# Patient Record
Sex: FEMALE | Race: WHITE | NOT HISPANIC OR LATINO | URBAN - METROPOLITAN AREA
[De-identification: names, ages, dates, MRNs, and addresses within clinical notes are randomized per-mention and may not be internally consistent; named-entity substitution may affect disease eponyms.]

---

## 2018-06-18 ENCOUNTER — APPOINTMENT (RX ONLY)
Dept: URBAN - METROPOLITAN AREA CLINIC 23 | Facility: CLINIC | Age: 63
Setting detail: DERMATOLOGY
End: 2018-06-18

## 2018-06-18 DIAGNOSIS — L81.4 OTHER MELANIN HYPERPIGMENTATION: ICD-10-CM

## 2018-06-18 DIAGNOSIS — D22 MELANOCYTIC NEVI: ICD-10-CM

## 2018-06-18 DIAGNOSIS — D18.0 HEMANGIOMA: ICD-10-CM

## 2018-06-18 DIAGNOSIS — L72.8 OTHER FOLLICULAR CYSTS OF THE SKIN AND SUBCUTANEOUS TISSUE: ICD-10-CM

## 2018-06-18 PROBLEM — D18.01 HEMANGIOMA OF SKIN AND SUBCUTANEOUS TISSUE: Status: ACTIVE | Noted: 2018-06-18

## 2018-06-18 PROBLEM — F41.9 ANXIETY DISORDER, UNSPECIFIED: Status: ACTIVE | Noted: 2018-06-18

## 2018-06-18 PROBLEM — D22.5 MELANOCYTIC NEVI OF TRUNK: Status: ACTIVE | Noted: 2018-06-18

## 2018-06-18 PROBLEM — D23.62 OTHER BENIGN NEOPLASM OF SKIN OF LEFT UPPER LIMB, INCLUDING SHOULDER: Status: ACTIVE | Noted: 2018-06-18

## 2018-06-18 PROCEDURE — 11300 SHAVE SKIN LESION 0.5 CM/<: CPT

## 2018-06-18 PROCEDURE — ? COUNSELING

## 2018-06-18 PROCEDURE — ? SHAVE REMOVAL

## 2018-06-18 PROCEDURE — 99202 OFFICE O/P NEW SF 15 MIN: CPT | Mod: 25

## 2018-06-18 ASSESSMENT — LOCATION DETAILED DESCRIPTION DERM
LOCATION DETAILED: LEFT LATERAL SUPERIOR CHEST
LOCATION DETAILED: RIGHT POSTERIOR SHOULDER
LOCATION DETAILED: LEFT POSTERIOR SHOULDER
LOCATION DETAILED: MIDDLE STERNUM
LOCATION DETAILED: RIGHT MEDIAL UPPER BACK

## 2018-06-18 ASSESSMENT — LOCATION ZONE DERM
LOCATION ZONE: TRUNK
LOCATION ZONE: ARM

## 2018-06-18 ASSESSMENT — LOCATION SIMPLE DESCRIPTION DERM
LOCATION SIMPLE: RIGHT UPPER BACK
LOCATION SIMPLE: RIGHT SHOULDER
LOCATION SIMPLE: LEFT SHOULDER
LOCATION SIMPLE: CHEST

## 2018-06-18 NOTE — PROCEDURE: SHAVE REMOVAL
Medical Necessity Information: It is in your best interest to select a reason for this procedure from the list below. All of these items fulfill various CMS LCD requirements except the new and changing color options.
Was A Bandage Applied: Yes
Consent was obtained from the patient. The risks and benefits to therapy were discussed in detail. Specifically, the risks of infection, scarring, bleeding, prolonged wound healing, incomplete removal, allergy to anesthesia, nerve injury and recurrence were addressed. Prior to the procedure, the treatment site was clearly identified and confirmed by the patient. All components of Universal Protocol/PAUSE Rule completed.
Anesthesia Type: 1% lidocaine with epinephrine
Post-Care Instructions: I reviewed with the patient in detail post-care instructions. Patient is to keep the biopsy site dry overnight, and then apply Vaseline and bandaid daily until healed. Patient may apply diluted hydrogen peroxide soaks to remove any crusting.
Size Of Margin In Cm (Margins Are Not Added To Billing Dimensions): -
Medical Necessity Clause: This procedure was medically necessary because the lesion that was treated was:
Billing Type: Third-Party Bill
X Size Of Lesion In Cm (Optional): 0
Size Of Lesion In Cm (Required): 0.4
Bill For Surgical Tray: no
Notification Instructions: Patient will be notified of biopsy results. However, patient instructed to call the office if not contacted within 2 weeks.
Anesthesia Volume In Cc: 0.3
Detail Level: Detailed
Wound Care: Vaseline
Accession #: [id number]
Path Notes (To The Dermatopathologist): Please check margins.
Biopsy Method: Dermablade
Hemostasis: Aluminum Chloride

## 2022-05-20 ENCOUNTER — HOSPITAL ENCOUNTER (OUTPATIENT)
Dept: MAMMOGRAPHY | Age: 67
Discharge: HOME | End: 2022-05-20
Attending: NURSE PRACTITIONER
Payer: MEDICARE

## 2022-05-20 DIAGNOSIS — Z78.0 POSTMENOPAUSAL STATE: ICD-10-CM

## 2022-05-20 DIAGNOSIS — Z12.31 SCREENING MAMMOGRAM, ENCOUNTER FOR: ICD-10-CM

## 2022-05-20 PROCEDURE — 77067 SCR MAMMO BI INCL CAD: CPT

## 2022-05-20 PROCEDURE — 77080 DXA BONE DENSITY AXIAL: CPT

## 2022-05-20 NOTE — PROGRESS NOTES
Please let patient know her dexa reveals low bone density. Increase calcium/vitamin D intake and weight baring exercises. Repeat scan in 2 years.      Ana Christianson NP

## 2022-05-20 NOTE — PROGRESS NOTES
I spoke with patient letting her know that her DEXA reveals low bone density. Advised to increase calcium/Vit D intake and do weight bearing exercises.   Repeat in 2 years

## 2022-06-10 DIAGNOSIS — I78.1 SPIDER VEINS: Primary | ICD-10-CM

## 2022-07-12 ENCOUNTER — TELEMEDICINE (OUTPATIENT)
Dept: FAMILY MEDICINE CLINIC | Facility: CLINIC | Age: 67
End: 2022-07-12
Payer: MEDICARE

## 2022-07-12 DIAGNOSIS — H92.01 RIGHT EAR PAIN: Primary | ICD-10-CM

## 2022-07-12 PROCEDURE — 99443 PR PHYS/QHP TELEPHONE EVALUATION 21-30 MIN: CPT | Performed by: FAMILY MEDICINE

## 2022-07-12 RX ORDER — AMOXICILLIN AND CLAVULANATE POTASSIUM 875; 125 MG/1; MG/1
1 TABLET, FILM COATED ORAL 2 TIMES DAILY
Qty: 20 TABLET | Refills: 0 | Status: SHIPPED | OUTPATIENT
Start: 2022-07-12 | End: 2022-07-14 | Stop reason: ALTCHOICE

## 2022-07-12 ASSESSMENT — PATIENT HEALTH QUESTIONNAIRE - PHQ9
SUM OF ALL RESPONSES TO PHQ9 QUESTIONS 1 & 2: 0
SUM OF ALL RESPONSES TO PHQ QUESTIONS 1-9: 0
SUM OF ALL RESPONSES TO PHQ QUESTIONS 1-9: 0
2. FEELING DOWN, DEPRESSED OR HOPELESS: 0
SUM OF ALL RESPONSES TO PHQ QUESTIONS 1-9: 0
1. LITTLE INTEREST OR PLEASURE IN DOING THINGS: 0
SUM OF ALL RESPONSES TO PHQ QUESTIONS 1-9: 0

## 2022-07-12 ASSESSMENT — ANXIETY QUESTIONNAIRES
7. FEELING AFRAID AS IF SOMETHING AWFUL MIGHT HAPPEN: 0
2. NOT BEING ABLE TO STOP OR CONTROL WORRYING: 0
3. WORRYING TOO MUCH ABOUT DIFFERENT THINGS: 0
5. BEING SO RESTLESS THAT IT IS HARD TO SIT STILL: 0
6. BECOMING EASILY ANNOYED OR IRRITABLE: 0
1. FEELING NERVOUS, ANXIOUS, OR ON EDGE: 0
4. TROUBLE RELAXING: 0
GAD7 TOTAL SCORE: 0
IF YOU CHECKED OFF ANY PROBLEMS ON THIS QUESTIONNAIRE, HOW DIFFICULT HAVE THESE PROBLEMS MADE IT FOR YOU TO DO YOUR WORK, TAKE CARE OF THINGS AT HOME, OR GET ALONG WITH OTHER PEOPLE: NOT DIFFICULT AT ALL

## 2022-07-12 ASSESSMENT — ENCOUNTER SYMPTOMS
SHORTNESS OF BREATH: 0
COUGH: 0
SINUS PAIN: 1

## 2022-07-12 NOTE — PROGRESS NOTES
Agustín Ellis (: 1955) is a 79 y.o. female, established patient, here for evaluation of the following chief complaint(s):   Otalgia (right ear andside of face hurts x 3 days, Advil,Nasal Spray, Ear drops) and Laryngitis       ASSESSMENT/PLAN:  1. Right ear pain  -     amoxicillin-clavulanate (AUGMENTIN) 875-125 MG per tablet; Take 1 tablet by mouth 2 times daily for 10 days, Disp-20 tablet, R-0Normal  Will trial amoxicillin for possible otitis media, bacterial sinusitis. If any tooth pain or potential abscesses develop will call dentist right away. We will also call if any new or worsening symptoms develop. Return if symptoms worsen or fail to improve. SUBJECTIVE/OBJECTIVE:  HPI   Patient presents with ear pressure and sinus pressure since Friday. She also had some jaw pain. She says her right ear feels very full. She denies any particular tooth pain, obvious redness, swelling, abscess. She denies any obvious or tender lymph nodes. She has had no cough, sore throat, fevers, congestion. She has had some sinus pressure. No COVID contacts or otherwise sick contacts. Review of Systems   Constitutional: Negative for activity change, appetite change, fever and unexpected weight change. HENT: Positive for ear pain and sinus pain. Respiratory: Negative for cough and shortness of breath. Cardiovascular: Negative for chest pain and palpitations. Skin: Negative for rash. Neurological: Negative for dizziness and headaches. Psychiatric/Behavioral: Negative for sleep disturbance. No flowsheet data found. Physical Exam    There were no vitals filed for this visit. Agustín Ellis, was evaluated through a synchronous (real-time) audio-video encounter. The patient (or guardian if applicable) is aware that this is a billable service, which includes applicable co-pays. This Virtual Visit was conducted with patient's (and/or legal guardian's) consent.  The visit was conducted pursuant to the emergency declaration under the [address], [facility] authority and the [employer] and [employer] General Act. Patient identification was verified, and a caregiver was present when appropriate. The patient was located at Home: [address] [facility] [address]. Provider was located at [facility] (Appt Dept): [address]. On this date, I spent 30 minutes reviewing previous notes, test results and face to face with the patient discussing the diagnosis and importance of compliance with the treatment plan as well as documenting on the day of the visit.

## 2022-07-13 ENCOUNTER — PATIENT MESSAGE (OUTPATIENT)
Dept: FAMILY MEDICINE CLINIC | Facility: CLINIC | Age: 67
End: 2022-07-13

## 2022-07-13 NOTE — TELEPHONE ENCOUNTER
From: Xin Dasilva  To: Dr. Shandra Calix: 7/13/2022 8:28 AM EDT  Subject: Facial Welts, swollen lips    When we spoke yesterday, I had mentioned my bottom lip was swollen. As the day progressed, raised welts have appeared around my lip, chin on the right side along with a rough patch on my check. Is this something the prescription will help with or do I need to be seen?

## 2022-07-14 ENCOUNTER — OFFICE VISIT (OUTPATIENT)
Dept: FAMILY MEDICINE CLINIC | Facility: CLINIC | Age: 67
End: 2022-07-14
Payer: MEDICARE

## 2022-07-14 VITALS
DIASTOLIC BLOOD PRESSURE: 60 MMHG | OXYGEN SATURATION: 98 % | WEIGHT: 143 LBS | HEART RATE: 80 BPM | TEMPERATURE: 98 F | RESPIRATION RATE: 12 BRPM | BODY MASS INDEX: 28.07 KG/M2 | SYSTOLIC BLOOD PRESSURE: 118 MMHG | HEIGHT: 60 IN

## 2022-07-14 DIAGNOSIS — B02.9 HERPES ZOSTER WITHOUT COMPLICATION: Primary | ICD-10-CM

## 2022-07-14 PROCEDURE — 99213 OFFICE O/P EST LOW 20 MIN: CPT | Performed by: NURSE PRACTITIONER

## 2022-07-14 PROCEDURE — 1123F ACP DISCUSS/DSCN MKR DOCD: CPT | Performed by: NURSE PRACTITIONER

## 2022-07-14 RX ORDER — IBUPROFEN 200 MG
400 TABLET ORAL EVERY 6 HOURS PRN
COMMUNITY

## 2022-07-14 RX ORDER — ACYCLOVIR 400 MG/1
400 TABLET ORAL
Qty: 50 TABLET | Refills: 0 | Status: SHIPPED | OUTPATIENT
Start: 2022-07-14 | End: 2022-07-14

## 2022-07-14 RX ORDER — ACYCLOVIR 400 MG/1
800 TABLET ORAL
Qty: 100 TABLET | Refills: 0 | Status: SHIPPED | OUTPATIENT
Start: 2022-07-14 | End: 2022-07-24

## 2022-07-14 RX ORDER — ACYCLOVIR 400 MG/1
800 TABLET ORAL
Qty: 100 TABLET | Refills: 0 | Status: SHIPPED | OUTPATIENT
Start: 2022-07-14 | End: 2022-07-14

## 2022-07-14 ASSESSMENT — ENCOUNTER SYMPTOMS
NAUSEA: 0
VOMITING: 0
COUGH: 0
ABDOMINAL PAIN: 0
SHORTNESS OF BREATH: 0
SORE THROAT: 0
DIARRHEA: 0
SINUS PAIN: 0
BACK PAIN: 0
WHEEZING: 0
FACIAL SWELLING: 1
EYE PAIN: 0
CONSTIPATION: 0

## 2022-07-14 ASSESSMENT — PATIENT HEALTH QUESTIONNAIRE - PHQ9
1. LITTLE INTEREST OR PLEASURE IN DOING THINGS: 0
SUM OF ALL RESPONSES TO PHQ QUESTIONS 1-9: 0
SUM OF ALL RESPONSES TO PHQ QUESTIONS 1-9: 0
SUM OF ALL RESPONSES TO PHQ9 QUESTIONS 1 & 2: 0
2. FEELING DOWN, DEPRESSED OR HOPELESS: 0
SUM OF ALL RESPONSES TO PHQ QUESTIONS 1-9: 0
SUM OF ALL RESPONSES TO PHQ QUESTIONS 1-9: 0

## 2022-07-14 ASSESSMENT — ANXIETY QUESTIONNAIRES
3. WORRYING TOO MUCH ABOUT DIFFERENT THINGS: 0
IF YOU CHECKED OFF ANY PROBLEMS ON THIS QUESTIONNAIRE, HOW DIFFICULT HAVE THESE PROBLEMS MADE IT FOR YOU TO DO YOUR WORK, TAKE CARE OF THINGS AT HOME, OR GET ALONG WITH OTHER PEOPLE: NOT DIFFICULT AT ALL
1. FEELING NERVOUS, ANXIOUS, OR ON EDGE: 0
7. FEELING AFRAID AS IF SOMETHING AWFUL MIGHT HAPPEN: 0
4. TROUBLE RELAXING: 0
2. NOT BEING ABLE TO STOP OR CONTROL WORRYING: 0
5. BEING SO RESTLESS THAT IT IS HARD TO SIT STILL: 0

## 2022-07-14 NOTE — PROGRESS NOTES
Trisha Aguilar (:  1955) is a 79 y.o. female,Established patient, here for evaluation of the following chief complaint(s):  Otalgia (on Amoxil since Tues. On Rassh on right side of face, ulcers iin mouth , teeth hurt, was there before the Amoxil)         ASSESSMENT/PLAN:  1. Herpes zoster without complication  -     acyclovir (ZOVIRAX) 400 MG tablet; Take 2 tablets by mouth 5 times daily for 10 days, Disp-100 tablet, R-0Normal  Shingles  New onset. Patient presents w/ classic unilateral, burning, tender, vesicular rash following 1 dermatome w/ hx of chicken pox as a child. Patient is in the 72 hour treatment window with new vesicles appearing. Reviewed the risks and benefits of antiviral therapy in this treatment window and patient is agreeable to start antiviral therapy. - Acyclovir 800 mg 5x/day x 7 days prescribed, side effects reviewed. - Advised to use Calamine lotion and Benadryl cream for itching prn  - Applying cool compresses for comfort. - Worrisome s/sx and indications for RTC reviewed: watch for the rash spreading or onset of secondary infection.  - Counseled patient on ramifications of post-herpetic neuralgia and discussed that treatment options are available to follow-up if needed. - Recommended to discuss the option of Shingrix vaccine with pharmacist    Return if symptoms worsen or fail to improve. Subjective   SUBJECTIVE/OBJECTIVE:  Reports symptoms started Friday (22) with ear pain. She was using some OTC sinus medication and wasn't having any relief of her symptoms. Reports that she started developing a rash on the  and . She saw Dr. Delon Jimenez 22 and was given a prescription for Augmentin. She reports the augmentin has not making any difference in her symptoms and she feels like her rash is getting worse. Review of Systems   Constitutional: Negative for appetite change, fatigue, fever and unexpected weight change.    HENT: Positive for ear pain and facial swelling. Negative for congestion, postnasal drip, sinus pain and sore throat. Eyes: Negative for pain. Respiratory: Negative for cough, shortness of breath and wheezing. Cardiovascular: Negative for palpitations and leg swelling. Gastrointestinal: Negative for abdominal pain, constipation, diarrhea, nausea and vomiting. Genitourinary: Negative for dysuria, frequency and urgency. Musculoskeletal: Negative for back pain and joint swelling. Skin: Positive for rash. Negative for wound. Neurological: Negative for dizziness, weakness and headaches. Hematological: Does not bruise/bleed easily. Objective   Physical Exam  Vitals reviewed. Constitutional:       Appearance: Normal appearance. HENT:      Head: Normocephalic and atraumatic. Eyes:      Extraocular Movements: Extraocular movements intact. Pupils: Pupils are equal, round, and reactive to light. Cardiovascular:      Rate and Rhythm: Normal rate and regular rhythm. Heart sounds: Normal heart sounds. Pulmonary:      Effort: Pulmonary effort is normal.      Breath sounds: Normal breath sounds. Abdominal:      General: Abdomen is flat. Skin:     General: Skin is warm and dry. Findings: Rash present. Rash is vesicular (following the V1 dermatome). Neurological:      General: No focal deficit present. Mental Status: She is alert and oriented to person, place, and time. An electronic signature was used to authenticate this note.     --JUDI Jenkins - CNP

## 2022-08-01 ENCOUNTER — OFFICE VISIT (OUTPATIENT)
Dept: FAMILY MEDICINE CLINIC | Facility: CLINIC | Age: 67
End: 2022-08-01
Payer: MEDICARE

## 2022-08-01 VITALS
DIASTOLIC BLOOD PRESSURE: 78 MMHG | HEIGHT: 60 IN | HEART RATE: 82 BPM | WEIGHT: 144 LBS | BODY MASS INDEX: 28.27 KG/M2 | RESPIRATION RATE: 12 BRPM | TEMPERATURE: 98 F | SYSTOLIC BLOOD PRESSURE: 120 MMHG | OXYGEN SATURATION: 98 %

## 2022-08-01 DIAGNOSIS — B02.9 HERPES ZOSTER WITHOUT COMPLICATION: Primary | ICD-10-CM

## 2022-08-01 PROCEDURE — 1123F ACP DISCUSS/DSCN MKR DOCD: CPT | Performed by: NURSE PRACTITIONER

## 2022-08-01 PROCEDURE — 99213 OFFICE O/P EST LOW 20 MIN: CPT | Performed by: NURSE PRACTITIONER

## 2022-08-01 RX ORDER — ACYCLOVIR 400 MG/1
400 TABLET ORAL
COMMUNITY

## 2022-08-01 RX ORDER — ACYCLOVIR 200 MG/5ML
200 SUSPENSION ORAL
COMMUNITY
End: 2022-08-01 | Stop reason: CLARIF

## 2022-08-01 ASSESSMENT — ENCOUNTER SYMPTOMS
CONSTIPATION: 0
EYE PAIN: 0
SHORTNESS OF BREATH: 0
WHEEZING: 0
ABDOMINAL PAIN: 0
VOMITING: 0
NAUSEA: 0
SORE THROAT: 0
COUGH: 0
SINUS PAIN: 0
BACK PAIN: 0
DIARRHEA: 0

## 2022-08-01 ASSESSMENT — PATIENT HEALTH QUESTIONNAIRE - PHQ9
SUM OF ALL RESPONSES TO PHQ QUESTIONS 1-9: 0
SUM OF ALL RESPONSES TO PHQ QUESTIONS 1-9: 0
SUM OF ALL RESPONSES TO PHQ9 QUESTIONS 1 & 2: 0
1. LITTLE INTEREST OR PLEASURE IN DOING THINGS: 0
SUM OF ALL RESPONSES TO PHQ QUESTIONS 1-9: 0
2. FEELING DOWN, DEPRESSED OR HOPELESS: 0
SUM OF ALL RESPONSES TO PHQ QUESTIONS 1-9: 0

## 2022-08-01 ASSESSMENT — ANXIETY QUESTIONNAIRES
1. FEELING NERVOUS, ANXIOUS, OR ON EDGE: 0
3. WORRYING TOO MUCH ABOUT DIFFERENT THINGS: 0
GAD7 TOTAL SCORE: 0
7. FEELING AFRAID AS IF SOMETHING AWFUL MIGHT HAPPEN: 0
5. BEING SO RESTLESS THAT IT IS HARD TO SIT STILL: 0
6. BECOMING EASILY ANNOYED OR IRRITABLE: 0
4. TROUBLE RELAXING: 0
IF YOU CHECKED OFF ANY PROBLEMS ON THIS QUESTIONNAIRE, HOW DIFFICULT HAVE THESE PROBLEMS MADE IT FOR YOU TO DO YOUR WORK, TAKE CARE OF THINGS AT HOME, OR GET ALONG WITH OTHER PEOPLE: NOT DIFFICULT AT ALL
2. NOT BEING ABLE TO STOP OR CONTROL WORRYING: 0

## 2022-08-01 NOTE — PROGRESS NOTES
Emir Wagner (:  1955) is a 79 y.o. female,Established patient, here for evaluation of the following chief complaint(s):  Follow-up (Shingles, ear still very painful and face is still sensitive)         ASSESSMENT/PLAN:  1. Herpes zoster without complication  -     [facility] - Maikel Aguero MD, Otolaryngology, [facility] that post-herpetic neuralgia can persist 1-3 months. Recommend follow up if sensitivity persists after rash is clears completely. Referral to ENT to evaluate ear. Return if symptoms worsen or fail to improve. Subjective   SUBJECTIVE/OBJECTIVE:  Shingles dx 22. Finished her course of valacyclovir but continues to have some rash. She is having some sensitivity still along the V1 dermatome. Her biggest concern is the discomfort she continues to have in her ear. Review of Systems   Constitutional:  Negative for appetite change, fatigue, fever and unexpected weight change. HENT:  Positive for ear pain. Negative for congestion, postnasal drip, sinus pain and sore throat. Eyes:  Negative for pain. Respiratory:  Negative for cough, shortness of breath and wheezing. Cardiovascular:  Negative for palpitations and leg swelling. Gastrointestinal:  Negative for abdominal pain, constipation, diarrhea, nausea and vomiting. Genitourinary:  Negative for dysuria, frequency and urgency. Musculoskeletal:  Negative for back pain and joint swelling. Skin:  Negative for rash and wound. Neurological:  Negative for dizziness, weakness and headaches. Hematological:  Does not bruise/bleed easily. Objective   Physical Exam  Vitals reviewed. Constitutional:       Appearance: Normal appearance. HENT:      Head: Normocephalic and atraumatic. Right Ear: Hearing, tympanic membrane and ear canal normal. Laceration (In EAM) present. Eyes:      Extraocular Movements: Extraocular movements intact.       Pupils: Pupils are equal, round, and reactive to light. Cardiovascular:      Rate and Rhythm: Normal rate and regular rhythm. Heart sounds: Normal heart sounds. Pulmonary:      Effort: Pulmonary effort is normal.      Breath sounds: Normal breath sounds. Abdominal:      General: Abdomen is flat. Skin:     General: Skin is warm and dry. Comments: Faint remnants of V1 vesicular rash present. Neurological:      General: No focal deficit present. Mental Status: She is alert and oriented to person, place, and time. An electronic signature was used to authenticate this note.     --JUDI Watson - CNP

## 2022-08-11 ENCOUNTER — OFFICE VISIT (OUTPATIENT)
Dept: ENT CLINIC | Age: 67
End: 2022-08-11
Payer: MEDICARE

## 2022-08-11 VITALS — HEIGHT: 60 IN | BODY MASS INDEX: 28.86 KG/M2 | RESPIRATION RATE: 18 BRPM | WEIGHT: 147 LBS

## 2022-08-11 DIAGNOSIS — H90.3 SENSORINEURAL HEARING LOSS, BILATERAL: Primary | ICD-10-CM

## 2022-08-11 DIAGNOSIS — B02.21 HERPES ZOSTER OTICUS: ICD-10-CM

## 2022-08-11 PROCEDURE — 92557 COMPREHENSIVE HEARING TEST: CPT | Performed by: AUDIOLOGIST

## 2022-08-11 PROCEDURE — 99204 OFFICE O/P NEW MOD 45 MIN: CPT | Performed by: AUDIOLOGIST

## 2022-08-11 PROCEDURE — 1123F ACP DISCUSS/DSCN MKR DOCD: CPT | Performed by: AUDIOLOGIST

## 2022-08-11 ASSESSMENT — ENCOUNTER SYMPTOMS
ABDOMINAL PAIN: 0
SHORTNESS OF BREATH: 0

## 2022-08-11 NOTE — PROGRESS NOTES
Chief Complaint   Patient presents with    Ear Problem     Patient states that she is here today to make sure the shingles in her right ear, it itch and feels plugged. HPI:  Obdulia House is a 79 y.o. female seen today in initial consultation for her R ear. She was dx w/ shingles back in mid-July- mainly p/w R otalgia and vesicles along R lower/mid face. The blisters have all resolved but she still c/o some persistent facial and ear pain and her R ear still feels clogged or stopped up. There was never any otorrhea or bleeding. She has no previous otologic hx. Past Medical History, Past Surgical History, Family history, Social History, and Medications were all reviewed with the patient today and updated as necessary. No Known Allergies  Patient Active Problem List   Diagnosis    Elevated blood-pressure reading without diagnosis of hypertension     Current Outpatient Medications   Medication Sig    acyclovir (ZOVIRAX) 400 MG tablet Take 400 mg by mouth 5 times daily Take 5 times a day    ibuprofen (ADVIL;MOTRIN) 200 MG tablet Take 400 mg by mouth every 6 hours as needed     No current facility-administered medications for this visit. History reviewed. No pertinent past medical history. Social History     Tobacco Use    Smoking status: Former     Types: Cigarettes     Quit date: 2006     Years since quittin.1    Smokeless tobacco: Never   Substance Use Topics    Alcohol use: Yes     Alcohol/week: 1.0 standard drink     No past surgical history on file. Family History   Problem Relation Age of Onset    Lung Cancer Father     Diabetes Mother     Osteoporosis Mother         ROS:    Review of Systems   Constitutional:  Negative for fever. Eyes:  Negative for visual disturbance. Respiratory:  Negative for shortness of breath. Cardiovascular:  Negative for chest pain. Gastrointestinal:  Negative for abdominal pain. Skin:  Negative for rash.    Neurological:  Negative for dizziness and

## 2022-10-10 ENCOUNTER — APPOINTMENT (RX ONLY)
Dept: URBAN - METROPOLITAN AREA CLINIC 329 | Facility: CLINIC | Age: 67
Setting detail: DERMATOLOGY
End: 2022-10-10

## 2022-10-10 DIAGNOSIS — I83.9 ASYMPTOMATIC VARICOSE VEINS OF LOWER EXTREMITIES: ICD-10-CM | Status: INADEQUATELY CONTROLLED

## 2022-10-10 DIAGNOSIS — L90.8 OTHER ATROPHIC DISORDERS OF SKIN: ICD-10-CM

## 2022-10-10 PROBLEM — I83.93 ASYMPTOMATIC VARICOSE VEINS OF BILATERAL LOWER EXTREMITIES: Status: ACTIVE | Noted: 2022-10-10

## 2022-10-10 PROCEDURE — ? OTHER

## 2022-10-10 PROCEDURE — 99202 OFFICE O/P NEW SF 15 MIN: CPT

## 2022-10-10 PROCEDURE — ? COUNSELING

## 2022-10-10 PROCEDURE — ? RECOMMENDATIONS

## 2022-10-10 ASSESSMENT — LOCATION DETAILED DESCRIPTION DERM
LOCATION DETAILED: RIGHT PROXIMAL PRETIBIAL REGION
LOCATION DETAILED: LEFT KNEE
LOCATION DETAILED: RIGHT PROXIMAL PRETIBIAL REGION
LOCATION DETAILED: LEFT SUPERIOR ANTERIOR NECK
LOCATION DETAILED: LEFT KNEE
LOCATION DETAILED: LEFT DISTAL PRETIBIAL REGION
LOCATION DETAILED: RIGHT DISTAL PRETIBIAL REGION
LOCATION DETAILED: RIGHT DISTAL PRETIBIAL REGION
LOCATION DETAILED: LEFT DISTAL PRETIBIAL REGION

## 2022-10-10 ASSESSMENT — LOCATION SIMPLE DESCRIPTION DERM
LOCATION SIMPLE: RIGHT PRETIBIAL REGION
LOCATION SIMPLE: LEFT KNEE
LOCATION SIMPLE: LEFT KNEE
LOCATION SIMPLE: LEFT PRETIBIAL REGION
LOCATION SIMPLE: LEFT ANTERIOR NECK
LOCATION SIMPLE: RIGHT PRETIBIAL REGION
LOCATION SIMPLE: LEFT PRETIBIAL REGION

## 2022-10-10 ASSESSMENT — LOCATION ZONE DERM
LOCATION ZONE: LEG
LOCATION ZONE: LEG
LOCATION ZONE: NECK

## 2022-10-10 NOTE — PROCEDURE: OTHER
Detail Level: Zone
Note Text (......Xxx Chief Complaint.): This diagnosis correlates with the
Render Risk Assessment In Note?: no
Other (Free Text): Vein center better option for all her veins

## 2022-10-10 NOTE — PROCEDURE: RECOMMENDATIONS
Render Risk Assessment In Note?: no
Detail Level: Zone
Recommendations (Free Text): Vein Center for the deeper veins
Recommendations (Free Text): Discussed micro needling with pt

## 2022-11-11 ENCOUNTER — OFFICE VISIT (OUTPATIENT)
Dept: FAMILY MEDICINE CLINIC | Facility: CLINIC | Age: 67
End: 2022-11-11
Payer: MEDICARE

## 2022-11-11 VITALS
HEIGHT: 60 IN | HEART RATE: 81 BPM | WEIGHT: 146.5 LBS | TEMPERATURE: 97.7 F | SYSTOLIC BLOOD PRESSURE: 138 MMHG | OXYGEN SATURATION: 99 % | BODY MASS INDEX: 28.76 KG/M2 | DIASTOLIC BLOOD PRESSURE: 82 MMHG | RESPIRATION RATE: 16 BRPM

## 2022-11-11 DIAGNOSIS — M25.512 CHRONIC LEFT SHOULDER PAIN: Primary | ICD-10-CM

## 2022-11-11 DIAGNOSIS — Z23 NEED FOR PROPHYLACTIC VACCINATION AGAINST STREPTOCOCCUS PNEUMONIAE (PNEUMOCOCCUS): ICD-10-CM

## 2022-11-11 DIAGNOSIS — G89.29 CHRONIC LEFT SHOULDER PAIN: Primary | ICD-10-CM

## 2022-11-11 PROCEDURE — G0009 ADMIN PNEUMOCOCCAL VACCINE: HCPCS | Performed by: NURSE PRACTITIONER

## 2022-11-11 PROCEDURE — 99214 OFFICE O/P EST MOD 30 MIN: CPT | Performed by: NURSE PRACTITIONER

## 2022-11-11 PROCEDURE — 1123F ACP DISCUSS/DSCN MKR DOCD: CPT | Performed by: NURSE PRACTITIONER

## 2022-11-11 PROCEDURE — 90670 PCV13 VACCINE IM: CPT | Performed by: NURSE PRACTITIONER

## 2022-11-11 SDOH — ECONOMIC STABILITY: FOOD INSECURITY: WITHIN THE PAST 12 MONTHS, THE FOOD YOU BOUGHT JUST DIDN'T LAST AND YOU DIDN'T HAVE MONEY TO GET MORE.: NEVER TRUE

## 2022-11-11 SDOH — ECONOMIC STABILITY: FOOD INSECURITY: WITHIN THE PAST 12 MONTHS, YOU WORRIED THAT YOUR FOOD WOULD RUN OUT BEFORE YOU GOT MONEY TO BUY MORE.: NEVER TRUE

## 2022-11-11 ASSESSMENT — ENCOUNTER SYMPTOMS
WHEEZING: 0
CONSTIPATION: 0
VOMITING: 0
SHORTNESS OF BREATH: 0
SORE THROAT: 0
DIARRHEA: 0
COUGH: 0
EYE PAIN: 0
SINUS PAIN: 0
BACK PAIN: 0
NAUSEA: 0
ABDOMINAL PAIN: 0

## 2022-11-11 ASSESSMENT — PATIENT HEALTH QUESTIONNAIRE - PHQ9
SUM OF ALL RESPONSES TO PHQ9 QUESTIONS 1 & 2: 0
2. FEELING DOWN, DEPRESSED OR HOPELESS: 0
SUM OF ALL RESPONSES TO PHQ QUESTIONS 1-9: 0
1. LITTLE INTEREST OR PLEASURE IN DOING THINGS: 0

## 2022-11-11 ASSESSMENT — SOCIAL DETERMINANTS OF HEALTH (SDOH): HOW HARD IS IT FOR YOU TO PAY FOR THE VERY BASICS LIKE FOOD, HOUSING, MEDICAL CARE, AND HEATING?: NOT HARD AT ALL

## 2022-11-11 NOTE — PROGRESS NOTES
Maribel Garza (:  1955) is a 79 y.o. female,Established patient, here for evaluation of the following chief complaint(s):  Follow-up (Still has a sore in right ear from the shingles )         ASSESSMENT/PLAN:  1. Chronic left shoulder pain  -     [facility]  2. Need for prophylactic vaccination against Streptococcus pneumoniae (pneumococcus)  -     Pneumococcal, PCV-13, PREVNAR 13, (age 10 wks+), IM    No evidence of remaining zoster vesicles in ear. Reassured it is likely post-herpetic neuralgia and will resolve over time. Will send to PT for shoulder pain. No red flags on exam, do not feel imaging is warranted at this time. Routine Health and Prevention:   Medications were reconciled at today's visit and their health maintenance items were reviewed & updated as well. Recommended exercise and balanced diet. Return in about 6 months (around 2023) for AWV. Subjective   SUBJECTIVE/OBJECTIVE:  Doing well. Does feel like she has some intermittent fullness in her ear that is residual from shingles. Also reports left deltoid pain when she raises her arm. It is particularly bad when she is lifting something but only in abduction. Review of Systems   Constitutional:  Negative for appetite change, fatigue, fever and unexpected weight change. HENT:  Negative for congestion, ear pain, postnasal drip, sinus pain and sore throat. Eyes:  Negative for pain. Respiratory:  Negative for cough, shortness of breath and wheezing. Cardiovascular:  Negative for palpitations and leg swelling. Gastrointestinal:  Negative for abdominal pain, constipation, diarrhea, nausea and vomiting. Genitourinary:  Negative for dysuria, frequency and urgency. Musculoskeletal:  Negative for back pain and joint swelling. Skin:  Negative for rash and wound. Neurological:  Negative for dizziness, weakness and headaches.    Hematological:  Does not bruise/bleed easily. Objective   Physical Exam  Vitals reviewed. Constitutional:       Appearance: Normal appearance. HENT:      Head: Normocephalic and atraumatic. Right Ear: Tympanic membrane and ear canal normal.      Left Ear: Tympanic membrane and ear canal normal.   Eyes:      Extraocular Movements: Extraocular movements intact. Pupils: Pupils are equal, round, and reactive to light. Cardiovascular:      Rate and Rhythm: Normal rate and regular rhythm. Heart sounds: Normal heart sounds. Pulmonary:      Effort: Pulmonary effort is normal.      Breath sounds: Normal breath sounds. Abdominal:      General: Abdomen is flat. Musculoskeletal:      Left shoulder: Tenderness present. Decreased range of motion. Skin:     General: Skin is warm and dry. Neurological:      General: No focal deficit present. Mental Status: She is alert and oriented to person, place, and time. On this date 11/11/2022 I have spent 32 minutes reviewing previous notes, test results and face to face with the patient discussing the diagnosis and importance of compliance with the treatment plan as well as documenting on the day of the visit. An electronic signature was used to authenticate this note.     --JUDI Arroyo - CNP

## 2022-12-01 ENCOUNTER — HOSPITAL ENCOUNTER (OUTPATIENT)
Dept: PHYSICAL THERAPY | Age: 67
Setting detail: RECURRING SERIES
End: 2022-12-01
Payer: MEDICARE

## 2022-12-06 ENCOUNTER — HOSPITAL ENCOUNTER (OUTPATIENT)
Dept: PHYSICAL THERAPY | Age: 67
Setting detail: RECURRING SERIES
Discharge: HOME | End: 2022-12-09
Payer: MEDICARE

## 2022-12-06 PROCEDURE — 97110 THERAPEUTIC EXERCISES: CPT

## 2022-12-06 PROCEDURE — 97161 PT EVAL LOW COMPLEX 20 MIN: CPT

## 2022-12-06 NOTE — PLAN OF CARE
John Coates  : 1955  Primary: [employer]  Secondary:  [address]  [address] [facility] [address]  Phone: [phone]  Fax: [phone] Plan Frequency: 1x per week  Plan of Care/Certification Expiration Date: 22    PT Visit Info:         Visit Count:  1                OUTPATIENT PHYSICAL THERAPY:             OP NOTE TYPE: Initial Assessment 2022               Episode  Appt Desk         Treatment Diagnosis:  Pain in Left Shoulder (M25.512)  Stiffness of Left Shoulder, Not elsewhere classified (M25.612)  Medical/Referring Diagnosis:  Chronic left shoulder pain [M25.512, G89.29]  Referring Physician:  JUDI Yang CNP, MD Orders:  PT Eval and Treat   Return MD Appt:  PRN  Date of Onset:       Allergies:  Patient has no known allergies. Restrictions/Precautions:           Medications Last Reviewed:  2022     SUBJECTIVE   History of Injury/Illness (Reason for Referral):  Started noticing L shoulder pain about 6 months ago, no specific CHARLES but notices the most when performing ABD movement with weights when exercising. Pain comes and goes, unsure of provocative movements outside of arm ABD. Brought up pain with PCP at last visit and was referred to PT; no imaging at this time. Describes aching pain at L lateral deltoid that can refer to lateral elbow at times. Denies UE numbness/tingling. Denies difficulty with reaching tasks but does acknowledge using R arm mainly. Denies pain with reaching behind back. Also has discomfort when holding 6 mo old grandchild for prolonged periods. Patient Stated Goal(s):  \"Learn what's going on with my shoulder\"  Initial:      0/10 Post Session:     0 10  Past Medical History/Comorbidities:   Ms. Gage Benoit  has no past medical history on file. Ms. Gage Benoit  has no past surgical history on file.   Social History/Living Environment:   Lives With: Spouse     Prior Level of Function/Work/Activity:   Prior level of function: Independent           Learning:   Does the patient/guardian have any barriers to learning?: No barriers  Will there be a co-learner?: No  What is the preferred language of the patient/guardian?: English  Is an  required?: No  How does the patient/guardian prefer to learn new concepts?: Demonstration; Pictures/Videos     Fall Risk Scale: Clarke Total Score: 0  Clarke Fall Risk: Low (0-24)     Dominant Side:  right handed      OBJECTIVE     ROM (R/L in °) AROM(PROM)    Shoulder flexion  145/132  Shoulder Abduction  140/135  Shoulder ER   70/75  Shoulder IR   50/45    Strength (R/L)   Shoulder flexion  5/4+  Shoulder Abduction  5/4+*  Shoulder ER   4+/4+  Shoulder IR   5/5    Joint Mobility  Normal bilaterally, inferior glide causing min \"achy\" symptoms at lateral deltoid. Palpation  Min pain provocation with palpation to supraspinatus insertion. Special Tests  (-) drop arm, painful arc, Baeza-daron, full can, empty cane, speed's, ER resisted test    ASSESSMENT   Initial Assessment:  Patient presents to physical therapy with L shoulder pain with mobility deficits. They present today with decreased ROM and decreased strength. These impairments are causing difficulty with the following tasks: weight training with ABD movements, holding grandchild for long periods of time. Patient will benefit from skilled physical therapy to address their above impairments and functional limitations to assist with returning to [facility]. Problem List: (Impacting functional limitations): Body Structures, Functions, Activity Limitations Requiring Skilled Therapeutic Intervention: Decreased ROM;  Decreased strength; Decreased ADL status     Therapy Prognosis:   Therapy Prognosis: Good     Initial Assessment Complexity:   Decision Making: Low Complexity    PLAN   Effective Dates: 12/6/22  TO Plan of Care/Certification Expiration Date: 12/27/22   Frequency/Duration: Plan Frequency: 1x per week   Interventions Planned (Treatment may consist of any combination of the following):    Current Treatment Recommendations: ROM; Strengthening; Manual; Home exercise program     Goals: (Goals have been discussed and agreed upon with patient.)  Short-Term Functional Goals: Time Frame: 1 week  Patient will be independent with HEP. Discharge Goals: Time Frame: 3 weeks  Patient will increase shoulder flexion ROM to at least 145 in order to reach [place]. Outcome Measure: Tool Used: Disabilities of the Arm, Shoulder and Hand (DASH) Questionnaire - Quick Version  Score:  Initial: 19/55  Most Recent: X/55 (Date: -- )   Interpretation of Score: The DASH is designed to measure the activities of daily living in person's with upper extremity dysfunction or pain. Each section is scored on a 1-5 scale, 5 representing the greatest disability. The scores of each section are added together for a total score of 55. Medical Necessity:   > Patient demonstrates good rehab potential due to higher previous functional level. Reason For Services/Other Comments:  > Patient continues to require skilled intervention due to above stated impairments and functional limitations. Total Duration:  Time In: 0930  Time Out: 1010    Regarding Annie Friedman  therapy, I certify that the treatment plan above will be carried out by a therapist or under their direction.   Thank you for this referral,  Erlinda Diaz, PT     Referring Physician Signature: Ele Navarro [facility] _______________________________ Date _____________        Post Session Pain  Charge Capture  PT Visit Info MD Guidelines  MyChart

## 2022-12-06 NOTE — PROGRESS NOTES
Valery Reyes  : 1955  Primary: [employer] [employer]  Secondary:  [address] @ [facility] [address] [place] [id number]  Phone: [phone]  Fax: [phone] Plan Frequency: 1x per week  Plan of Care/Certification Expiration Date: 22    PT Visit Info:  No data recorded   Visit Count:  1   OUTPATIENT PHYSICAL THERAPY:OP NOTE TYPE: Treatment Note 2022       Episode  }Appt Desk             Treatment Diagnosis:  Pain in Left Shoulder (M25.512)  Stiffness of Left Shoulder, Not elsewhere classified (M25.612)  Medical/Referring Diagnosis:  Chronic left shoulder pain [M25.512, G89.29]  Referring Physician:  JUDI Garcia CNP, MD Orders:  PT Eval and Treat   Date of Onset:  No data recorded   Allergies:   Patient has no known allergies. Restrictions/Precautions:  No data recorded  No data recorded   Interventions Planned (Treatment may consist of any combination of the following):    Current Treatment Recommendations: ROM; Strengthening; Manual; Home exercise program     Subjective Comments: See IE 22. Initial:}     0/10Post Session:        0/10  Medications Last Reviewed:  2022  Updated Objective Findings:  See IE 22. Treatment   TREATMENT:   THERAPEUTIC ACTIVITY: ( see below for minutes): Therapeutic activities per grid below to improve mobility, strength, balance and coordination. Required minimal visual, verbal, manual and tactile cues to improve independence and safety with daily activities . THERAPEUTIC EXERCISE: (see below for minutes): Exercises per grid below to improve mobility, strength, balance and coordination. Required minimal verbal and manual cues to promote proper body alignment, promote proper body posture and promote proper body mechanics. Progressed resistance, range, repetitions and complexity of movement as indicated.   MANUAL THERAPY: (see below for minutes): Joint mobilization and Soft tissue mobilization was utilized and necessary because of the patient's restricted joint motion, painful spasm, loss of articular motion and restricted motion of soft tissue. MODALITIES: (see below for minutes): to decrease pain   SELF CARE: (see below for minutes): Procedure(s) (per grid) utilized to improve and/or restore self-care/home management as related to dressing, bathing and grooming. Required minimal verbal cueing to facilitate activities of daily living skills and compensatory activities    Date: 12/6/22        Modalities:                                Therapeutic Exercise:         HEP education        Cross body stretch  3x30\"        Shoulder ER  3x10  red        Wall slide  3x10                       Proprioceptive Activities:                                Manual Therapy:                        Functional Activities:                                        Access Code: [email or web address]  URL: [email or web address]  Date: 12/06/2022  Prepared by: Naif Rausch    Exercises  Standing Cross Body Shoulder Stretch at Wall - 1 x daily - 7 x weekly - 3 sets - 30 hold  Shoulder External Rotation with Anchored Resistance - 1 x daily - 7 x weekly - 3 sets - 10 reps  Shoulder Flexion Wall Slide with Towel - 1 x daily - 7 x weekly - 3 sets - 10 reps      Treatment/Session Summary:    Treatment Assessment: See IE 12/6/22. Communication/Consultation:  None today  Equipment provided today:  None  Recommendations/Intent for next treatment session: Next visit will focus on update HEP, shoulder mobility.     Total Treatment Billable Duration:  40 minutes  Time In: 0930  Time Out: [address] [address], PT       Charge Capture  }Post Session Pain  PT Visit Info  [employer] Portal  MD Guidelines  Scanned Media  Benefits  MyChart    Future Appointments   Date Time Provider Griffin Albert   12/8/2022 11:45 AM Carmen Carter PTA [facility]   12/13/2022  9:30 AM Naif Rausch PT [facility]   12/15/2022  9:30 AM Rosa Lubin

## 2022-12-08 ENCOUNTER — HOSPITAL ENCOUNTER (OUTPATIENT)
Dept: PHYSICAL THERAPY | Age: 67
Setting detail: RECURRING SERIES
End: 2022-12-08
Payer: MEDICARE

## 2022-12-13 ENCOUNTER — APPOINTMENT (OUTPATIENT)
Dept: PHYSICAL THERAPY | Age: 67
End: 2022-12-13
Payer: MEDICARE

## 2022-12-15 ENCOUNTER — APPOINTMENT (OUTPATIENT)
Dept: PHYSICAL THERAPY | Age: 67
End: 2022-12-15
Payer: MEDICARE

## 2022-12-20 ENCOUNTER — APPOINTMENT (OUTPATIENT)
Dept: PHYSICAL THERAPY | Age: 67
End: 2022-12-20
Payer: MEDICARE

## 2022-12-22 ENCOUNTER — APPOINTMENT (OUTPATIENT)
Dept: PHYSICAL THERAPY | Age: 67
End: 2022-12-22
Payer: MEDICARE

## 2022-12-29 ENCOUNTER — APPOINTMENT (OUTPATIENT)
Dept: PHYSICAL THERAPY | Age: 67
End: 2022-12-29
Payer: MEDICARE

## 2023-01-18 ENCOUNTER — OFFICE VISIT (OUTPATIENT)
Dept: FAMILY MEDICINE CLINIC | Facility: CLINIC | Age: 68
End: 2023-01-18
Payer: MEDICARE

## 2023-01-18 VITALS
WEIGHT: 148.2 LBS | RESPIRATION RATE: 18 BRPM | DIASTOLIC BLOOD PRESSURE: 78 MMHG | TEMPERATURE: 98.1 F | OXYGEN SATURATION: 95 % | HEART RATE: 93 BPM | SYSTOLIC BLOOD PRESSURE: 160 MMHG | HEIGHT: 60 IN | BODY MASS INDEX: 29.09 KG/M2

## 2023-01-18 DIAGNOSIS — R19.09 GROIN MASS IN FEMALE: Primary | ICD-10-CM

## 2023-01-18 PROCEDURE — 99212 OFFICE O/P EST SF 10 MIN: CPT | Performed by: NURSE PRACTITIONER

## 2023-01-18 PROCEDURE — 1123F ACP DISCUSS/DSCN MKR DOCD: CPT | Performed by: NURSE PRACTITIONER

## 2023-01-18 ASSESSMENT — ENCOUNTER SYMPTOMS
COUGH: 0
SINUS PAIN: 0
WHEEZING: 0
VOMITING: 0
SORE THROAT: 0
EYE PAIN: 0
NAUSEA: 0
ABDOMINAL PAIN: 0
SHORTNESS OF BREATH: 0
CONSTIPATION: 0
DIARRHEA: 0
BACK PAIN: 0

## 2023-01-18 NOTE — PROGRESS NOTES
Lizette Casas (:  1955) is a 79 y.o. female,Established patient, here for evaluation of the following chief complaint(s): Other (Lump on the side  of leg between groin///Started: Yesterday morning when pt noticed )         ASSESSMENT/PLAN:  1. Groin mass in female  -     US PELVIS LIMITED; Future    Suspect left femoral canal hernia, non-reducible. Will ultrasound for confirmation. Likely will need surgical consult. Advised to discontinue any activity that increased intra-abdominal pressure. Thoroughly reviewed red flags indicating emergent evaluation. Patient verbalized understanding. No follow-ups on file. Subjective   SUBJECTIVE/OBJECTIVE:  Reports she noticed a lump in her left groin yesterday. She has changed her exercise pattern and has started doing a lot of core strengthening. It doesn't \"hurt\" but she says it aches and she can tell it's there. States it felt like an egg. Review of Systems   Constitutional:  Negative for appetite change, fatigue, fever and unexpected weight change. HENT:  Negative for congestion, ear pain, postnasal drip, sinus pain and sore throat. Eyes:  Negative for pain. Respiratory:  Negative for cough, shortness of breath and wheezing. Cardiovascular:  Negative for palpitations and leg swelling. Gastrointestinal:  Negative for abdominal pain, constipation, diarrhea, nausea and vomiting. Genitourinary:  Negative for dysuria, frequency and urgency. Musculoskeletal:  Negative for back pain and joint swelling. Skin:  Negative for rash and wound. Neurological:  Negative for dizziness, weakness and headaches. Hematological:  Does not bruise/bleed easily. Objective   Physical Exam  Vitals reviewed. Constitutional:       Appearance: Normal appearance. HENT:      Head: Normocephalic and atraumatic. Eyes:      Extraocular Movements: Extraocular movements intact. Pupils: Pupils are equal, round, and reactive to light. Cardiovascular:      Rate and Rhythm: Normal rate and regular rhythm. Heart sounds: Normal heart sounds. Pulmonary:      Effort: Pulmonary effort is normal.      Breath sounds: Normal breath sounds. Abdominal:      General: Abdomen is flat. Hernia: A hernia is present. Hernia is present in the left femoral area. Skin:     General: Skin is warm and dry. Neurological:      General: No focal deficit present. Mental Status: She is alert and oriented to person, place, and time. An electronic signature was used to authenticate this note.     --JUDI Packer - CNP

## 2023-01-25 ENCOUNTER — HOSPITAL ENCOUNTER (OUTPATIENT)
Dept: ULTRASOUND IMAGING | Age: 68
Discharge: HOME | End: 2023-01-28

## 2023-01-25 DIAGNOSIS — R19.09 GROIN MASS IN FEMALE: ICD-10-CM

## 2023-01-26 ENCOUNTER — TELEPHONE (OUTPATIENT)
Dept: FAMILY MEDICINE CLINIC | Facility: CLINIC | Age: 68
End: 2023-01-26

## 2023-01-26 NOTE — TELEPHONE ENCOUNTER
I am not very good at reading ultrasounds so I'm not sure what it is. The radiologist was also unsure so that's why we are doing the CT scan. It is not urgent, but radiology should be calling to schedule her within the next couple of days. I'll call her with the results when they are available.      Flora Melo, APRN - CNP

## 2023-01-26 NOTE — TELEPHONE ENCOUNTER
----- Message from [facility], [address] - CNP sent at 1/25/2023  2:20 PM EST -----  Please let her know that her ultrasound was inconclusive. I am ordering a CT scan for better evaluation.      [facility], APRN - CNP

## 2023-01-26 NOTE — TELEPHONE ENCOUNTER
Pt asked do you think its something else other than a hernia? Pt asked how soon can we get the CT scan done? Contacted patient. Advised of results and provider comments. Verbalized understanding. No questions.      LIVIA CANAS

## 2023-02-03 ENCOUNTER — HOSPITAL ENCOUNTER (OUTPATIENT)
Dept: CT IMAGING | Age: 68
Discharge: HOME | End: 2023-02-06

## 2023-02-03 ENCOUNTER — TELEPHONE (OUTPATIENT)
Dept: FAMILY MEDICINE CLINIC | Facility: CLINIC | Age: 68
End: 2023-02-03

## 2023-02-03 DIAGNOSIS — K41.90 NON-RECURRENT UNILATERAL FEMORAL HERNIA WITHOUT OBSTRUCTION OR GANGRENE: Primary | ICD-10-CM

## 2023-02-03 DIAGNOSIS — R19.09 GROIN MASS IN FEMALE: ICD-10-CM

## 2023-02-03 NOTE — TELEPHONE ENCOUNTER
Pt verbalized she wants to be referred to surgery management. Contacted patient. Advised of results and provider comments. Verbalized understanding. No questions.      LIVIA CANAS

## 2023-02-03 NOTE — TELEPHONE ENCOUNTER
----- Message from [facility], APRN - CNP sent at 2/3/2023 11:20 AM EST -----  Please let patient know that she does have a femoral hernia confirmed by CT scan. Does she want a surgery referral for management options?     [facility], APRN - CNP

## 2023-02-08 ENCOUNTER — OFFICE VISIT (OUTPATIENT)
Dept: SURGERY | Age: 68
End: 2023-02-08
Payer: MEDICARE

## 2023-02-08 VITALS
SYSTOLIC BLOOD PRESSURE: 140 MMHG | OXYGEN SATURATION: 96 % | BODY MASS INDEX: 29.06 KG/M2 | DIASTOLIC BLOOD PRESSURE: 80 MMHG | HEIGHT: 60 IN | WEIGHT: 148 LBS | HEART RATE: 94 BPM

## 2023-02-08 DIAGNOSIS — K40.90 UNILATERAL INGUINAL HERNIA WITHOUT OBSTRUCTION OR GANGRENE, RECURRENCE NOT SPECIFIED: Primary | ICD-10-CM

## 2023-02-08 PROCEDURE — 1123F ACP DISCUSS/DSCN MKR DOCD: CPT | Performed by: STUDENT IN AN ORGANIZED HEALTH CARE EDUCATION/TRAINING PROGRAM

## 2023-02-08 PROCEDURE — 99204 OFFICE O/P NEW MOD 45 MIN: CPT | Performed by: STUDENT IN AN ORGANIZED HEALTH CARE EDUCATION/TRAINING PROGRAM

## 2023-02-08 NOTE — PROGRESS NOTES
General Surgery New Patient Office Visit:    Chief Complaint:    Pt presents with concerns about a left inguinal hernia. Patient states that recently she started noticing a pain within this area and bulge. She states that this pain when she switched up her workout routine. She states that it has gotten a little bit better. Patient has previously had an open cholecystectomy    Medications: [unfilled]     Allergies: No Known Allergies     Past History:  History reviewed. No pertinent past medical history. No past surgical history on file. Family and Social History:  Family History   Problem Relation Age of Onset    Lung Cancer Father     Diabetes Mother     Osteoporosis Mother      Social History     Socioeconomic History    Marital status:      Spouse name: Not on file    Number of children: Not on file    Years of education: Not on file    Highest education level: Not on file   Occupational History    Not on file   Tobacco Use    Smoking status: Former     Packs/day: 1.00     Years: 40.00     Pack years: 40.00     Types: Cigarettes     Quit date: 2006     Years since quittin.6    Smokeless tobacco: Never   Vaping Use    Vaping Use: Never used   Substance and Sexual Activity    Alcohol use:  Yes     Alcohol/week: 1.0 standard drink    Drug use: Never    Sexual activity: Not on file   Other Topics Concern    Not on file   Social History Narrative    Not on file     Social Determinants of Health     Financial Resource Strain: Low Risk     Difficulty of Paying Living Expenses: Not hard at all   Food Insecurity: No Food Insecurity    Worried About Running Out of Food in the Last Year: Never true    Ran Out of Food in the Last Year: Never true   Transportation Needs: Not on file   Physical Activity: Sufficiently Active    Days of Exercise per Week: 6 days    Minutes of Exercise per Session: 60 min   Stress: Not on file   Social Connections: Not on file   Intimate Partner Violence: Not At Risk Fear of Current or Ex-Partner: No    Emotionally Abused: No    Physically Abused: No    Sexually Abused: No   Housing Stability: Not on file        REVIEW OF SYSTEMS: 10 Point ROS negative except what is in HPI    PHYSICAL EXAMINATION:    BP (!) 140/80   Pulse 94   Ht 5' (1.524 m)   Wt 148 lb (67.1 kg)   LMP  (LMP Unknown)   SpO2 96%   BMI 28.90 kg/m²     General Appearance AOx3, in no acute distress  Eyes Conjunctivae/corneas clear. PERRL, EOM's intact. No scleral icterus  Ears, Nose, Throat ENT exam normal, no neck nodes or sinus tenderness  Neck supple, no significant adenopathy. No notable JVD  Respiratory No chest wall deformities or tenderness, respiratory effort normal, no use of accessory muscles. Cardiovascular RRR. No chest wall tenderness. Gastrointestinal Abdomen soft, nontender, nondistended. BS x4. No rebound, guarding, or rigidity present. No palpable masses. No CVA tenderness left inguinal hernia  Lymphatics No palpable lymphadenopathy, no hepatosplenomegaly  Musculoskeletal No joint tenderness, deformity or swelling. Full ROM UE/LE. Distal pulses intact UE/LE. No edema, cyanosis, or venous stasis changes. Skin Normal coloration and turgor, no rashes, no suspicious skin lesions noted  Neurological alert, oriented, normal speech, no focal findings or movement disorder noted, CN II-XII intact  Psychiatric Alert and oriented, appropriate affect    Images: CT Result (most recent):  CT ABDOMEN PELVIS W IV CONTRAST 02/03/2023    Narrative  CT OF THE ABDOMEN AND PELVIS WITH CONTRAST. CLINICAL INDICATION: Left inguinal mass on ultrasound    PROCEDURE: Serial thin section axial images obtained from the lung bases through  the proximal femurs following the administration of 100 cc of Isovue 370  intravenous contrast.  Radiation dose reduction techniques were used for this  study.  Our CT scanners use one or all of the following: Automated exposure  control, adjusted of the mA and/or kV according to patient size, iterative  reconstruction    COMPARISON: Pelvic ultrasound dated 1/25/2023    FINDINGS:    CT ABDOMEN: Cholecystectomy clips are present. The liver and spleen are normal.  The pancreas is normal. The stomach is normal. The kidneys are symmetric in size  and contrast enhancement. There is no hydronephrosis. The adrenal glands are  normal. No upper abdominal or retroperitoneal lymphadenopathy. The bowel is  normal in course and caliber. CT PELVIS: The appendix is normal. There is marked sigmoid colon diverticulosis. The uterus and adnexa are unremarkable. The bladder is normal. There is a small  fat-containing left femoral hernia that corresponds to the palpable abnormality  noted on ultrasound. Herniated fat measures roughly 2.2 cm. No lymphadenopathy  evident. No aggressive bone lesions identified. Impression  1. 2.2 cm fat-containing left femoral hernia that corresponds to the abnormality  on ultrasound. 2. Sigmoid colon diverticulosis without diverticulitis. Assessment:  Left inguinal hernia    Plan: Will proceed with robotic left possible bilateral inguinal hernia repair with mesh. Technical details of the procedure are reviewed. Risks reviewed include risks of anesthesia, bleeding, infection, visceral injury, persistent post-operative pain, and hernia recurrence. All questions are answered.

## 2023-02-13 ENCOUNTER — HOSPITAL ENCOUNTER (OUTPATIENT)
Age: 68
Setting detail: OUTPATIENT SURGERY
Discharge: HOME | End: 2023-02-13
Attending: STUDENT IN AN ORGANIZED HEALTH CARE EDUCATION/TRAINING PROGRAM | Admitting: STUDENT IN AN ORGANIZED HEALTH CARE EDUCATION/TRAINING PROGRAM
Payer: MEDICARE

## 2023-02-13 ENCOUNTER — ANESTHESIA (OUTPATIENT)
Dept: SURGERY | Age: 68
End: 2023-02-13
Payer: MEDICARE

## 2023-02-13 ENCOUNTER — ANESTHESIA EVENT (OUTPATIENT)
Dept: SURGERY | Age: 68
End: 2023-02-13
Payer: MEDICARE

## 2023-02-13 VITALS
SYSTOLIC BLOOD PRESSURE: 113 MMHG | BODY MASS INDEX: 28.02 KG/M2 | HEART RATE: 64 BPM | RESPIRATION RATE: 16 BRPM | HEIGHT: 61 IN | DIASTOLIC BLOOD PRESSURE: 56 MMHG | TEMPERATURE: 97.8 F | WEIGHT: 148.4 LBS | OXYGEN SATURATION: 95 %

## 2023-02-13 DIAGNOSIS — K40.90 UNILATERAL INGUINAL HERNIA WITHOUT OBSTRUCTION OR GANGRENE, RECURRENCE NOT SPECIFIED: Primary | ICD-10-CM

## 2023-02-13 LAB — HGB BLD-MCNC: 14.5 G/DL (ref 11.7–15.4)

## 2023-02-13 PROCEDURE — 2500000003 HC RX 250 WO HCPCS: Performed by: STUDENT IN AN ORGANIZED HEALTH CARE EDUCATION/TRAINING PROGRAM

## 2023-02-13 PROCEDURE — 6360000002 HC RX W HCPCS: Performed by: ANESTHESIOLOGY

## 2023-02-13 PROCEDURE — 2709999900 HC NON-CHARGEABLE SUPPLY: Performed by: STUDENT IN AN ORGANIZED HEALTH CARE EDUCATION/TRAINING PROGRAM

## 2023-02-13 PROCEDURE — 6370000000 HC RX 637 (ALT 250 FOR IP): Performed by: ANESTHESIOLOGY

## 2023-02-13 PROCEDURE — 2500000003 HC RX 250 WO HCPCS: Performed by: REGISTERED NURSE

## 2023-02-13 PROCEDURE — 3600000004 HC SURGERY LEVEL 4 BASE: Performed by: STUDENT IN AN ORGANIZED HEALTH CARE EDUCATION/TRAINING PROGRAM

## 2023-02-13 PROCEDURE — 49650 LAP ING HERNIA REPAIR INIT: CPT | Performed by: STUDENT IN AN ORGANIZED HEALTH CARE EDUCATION/TRAINING PROGRAM

## 2023-02-13 PROCEDURE — 7100000011 HC PHASE II RECOVERY - ADDTL 15 MIN: Performed by: STUDENT IN AN ORGANIZED HEALTH CARE EDUCATION/TRAINING PROGRAM

## 2023-02-13 PROCEDURE — 3700000001 HC ADD 15 MINUTES (ANESTHESIA): Performed by: STUDENT IN AN ORGANIZED HEALTH CARE EDUCATION/TRAINING PROGRAM

## 2023-02-13 PROCEDURE — C1781 MESH (IMPLANTABLE): HCPCS | Performed by: STUDENT IN AN ORGANIZED HEALTH CARE EDUCATION/TRAINING PROGRAM

## 2023-02-13 PROCEDURE — 3600000014 HC SURGERY LEVEL 4 ADDTL 15MIN: Performed by: STUDENT IN AN ORGANIZED HEALTH CARE EDUCATION/TRAINING PROGRAM

## 2023-02-13 PROCEDURE — 3700000000 HC ANESTHESIA ATTENDED CARE: Performed by: STUDENT IN AN ORGANIZED HEALTH CARE EDUCATION/TRAINING PROGRAM

## 2023-02-13 PROCEDURE — 85018 HEMOGLOBIN: CPT

## 2023-02-13 PROCEDURE — 2580000003 HC RX 258: Performed by: ANESTHESIOLOGY

## 2023-02-13 PROCEDURE — 6360000002 HC RX W HCPCS: Performed by: REGISTERED NURSE

## 2023-02-13 PROCEDURE — 7100000000 HC PACU RECOVERY - FIRST 15 MIN: Performed by: STUDENT IN AN ORGANIZED HEALTH CARE EDUCATION/TRAINING PROGRAM

## 2023-02-13 PROCEDURE — 7100000001 HC PACU RECOVERY - ADDTL 15 MIN: Performed by: STUDENT IN AN ORGANIZED HEALTH CARE EDUCATION/TRAINING PROGRAM

## 2023-02-13 PROCEDURE — 6360000002 HC RX W HCPCS: Performed by: STUDENT IN AN ORGANIZED HEALTH CARE EDUCATION/TRAINING PROGRAM

## 2023-02-13 PROCEDURE — 7100000010 HC PHASE II RECOVERY - FIRST 15 MIN: Performed by: STUDENT IN AN ORGANIZED HEALTH CARE EDUCATION/TRAINING PROGRAM

## 2023-02-13 DEVICE — LAPAROSCOPIC SELF-FIXATING MESH, LEFT ANATOMICAL
Type: IMPLANTABLE DEVICE | Site: INGUINAL | Status: FUNCTIONAL
Brand: PROGRIP

## 2023-02-13 RX ORDER — PROCHLORPERAZINE EDISYLATE 5 MG/ML
5 INJECTION INTRAMUSCULAR; INTRAVENOUS
Status: COMPLETED | OUTPATIENT
Start: 2023-02-13 | End: 2023-02-13

## 2023-02-13 RX ORDER — OXYCODONE HYDROCHLORIDE 5 MG/1
10 TABLET ORAL PRN
Status: DISCONTINUED | OUTPATIENT
Start: 2023-02-13 | End: 2023-02-13 | Stop reason: HOSPADM

## 2023-02-13 RX ORDER — SODIUM CHLORIDE 0.9 % (FLUSH) 0.9 %
5-40 SYRINGE (ML) INJECTION PRN
Status: DISCONTINUED | OUTPATIENT
Start: 2023-02-13 | End: 2023-02-13 | Stop reason: HOSPADM

## 2023-02-13 RX ORDER — LIDOCAINE HYDROCHLORIDE 20 MG/ML
INJECTION, SOLUTION EPIDURAL; INFILTRATION; INTRACAUDAL; PERINEURAL PRN
Status: DISCONTINUED | OUTPATIENT
Start: 2023-02-13 | End: 2023-02-13 | Stop reason: SDUPTHER

## 2023-02-13 RX ORDER — ACETAMINOPHEN 500 MG
1000 TABLET ORAL ONCE
Status: COMPLETED | OUTPATIENT
Start: 2023-02-13 | End: 2023-02-13

## 2023-02-13 RX ORDER — ONDANSETRON 2 MG/ML
4 INJECTION INTRAMUSCULAR; INTRAVENOUS
Status: COMPLETED | OUTPATIENT
Start: 2023-02-13 | End: 2023-02-13

## 2023-02-13 RX ORDER — OXYCODONE HYDROCHLORIDE 5 MG/1
5 TABLET ORAL PRN
Status: DISCONTINUED | OUTPATIENT
Start: 2023-02-13 | End: 2023-02-13 | Stop reason: HOSPADM

## 2023-02-13 RX ORDER — PROMETHAZINE HYDROCHLORIDE 25 MG/ML
3.12 INJECTION, SOLUTION INTRAMUSCULAR; INTRAVENOUS ONCE
Status: DISCONTINUED | OUTPATIENT
Start: 2023-02-13 | End: 2023-02-13

## 2023-02-13 RX ORDER — HYDROMORPHONE HCL 110MG/55ML
0.5 PATIENT CONTROLLED ANALGESIA SYRINGE INTRAVENOUS EVERY 5 MIN PRN
Status: DISCONTINUED | OUTPATIENT
Start: 2023-02-13 | End: 2023-02-13 | Stop reason: HOSPADM

## 2023-02-13 RX ORDER — DEXAMETHASONE SODIUM PHOSPHATE 10 MG/ML
INJECTION INTRAMUSCULAR; INTRAVENOUS PRN
Status: DISCONTINUED | OUTPATIENT
Start: 2023-02-13 | End: 2023-02-13 | Stop reason: SDUPTHER

## 2023-02-13 RX ORDER — PROMETHAZINE HYDROCHLORIDE 25 MG/ML
3.12 INJECTION, SOLUTION INTRAMUSCULAR; INTRAVENOUS ONCE
Status: DISCONTINUED | OUTPATIENT
Start: 2023-02-13 | End: 2023-02-13 | Stop reason: HOSPADM

## 2023-02-13 RX ORDER — GLYCOPYRROLATE 0.2 MG/ML
INJECTION INTRAMUSCULAR; INTRAVENOUS PRN
Status: DISCONTINUED | OUTPATIENT
Start: 2023-02-13 | End: 2023-02-13 | Stop reason: SDUPTHER

## 2023-02-13 RX ORDER — OXYCODONE HYDROCHLORIDE AND ACETAMINOPHEN 5; 325 MG/1; MG/1
1 TABLET ORAL EVERY 4 HOURS PRN
Qty: 15 TABLET | Refills: 0 | Status: SHIPPED | OUTPATIENT
Start: 2023-02-13 | End: 2023-02-18

## 2023-02-13 RX ORDER — SODIUM CHLORIDE, SODIUM LACTATE, POTASSIUM CHLORIDE, CALCIUM CHLORIDE 600; 310; 30; 20 MG/100ML; MG/100ML; MG/100ML; MG/100ML
INJECTION, SOLUTION INTRAVENOUS CONTINUOUS
Status: DISCONTINUED | OUTPATIENT
Start: 2023-02-13 | End: 2023-02-13 | Stop reason: HOSPADM

## 2023-02-13 RX ORDER — BUPIVACAINE HYDROCHLORIDE 2.5 MG/ML
INJECTION, SOLUTION EPIDURAL; INFILTRATION; INTRACAUDAL PRN
Status: DISCONTINUED | OUTPATIENT
Start: 2023-02-13 | End: 2023-02-13 | Stop reason: HOSPADM

## 2023-02-13 RX ORDER — FENTANYL CITRATE 50 UG/ML
INJECTION, SOLUTION INTRAMUSCULAR; INTRAVENOUS PRN
Status: DISCONTINUED | OUTPATIENT
Start: 2023-02-13 | End: 2023-02-13 | Stop reason: SDUPTHER

## 2023-02-13 RX ORDER — HYDROMORPHONE HCL 110MG/55ML
0.25 PATIENT CONTROLLED ANALGESIA SYRINGE INTRAVENOUS EVERY 5 MIN PRN
Status: DISCONTINUED | OUTPATIENT
Start: 2023-02-13 | End: 2023-02-13 | Stop reason: HOSPADM

## 2023-02-13 RX ORDER — PROPOFOL 10 MG/ML
INJECTION, EMULSION INTRAVENOUS PRN
Status: DISCONTINUED | OUTPATIENT
Start: 2023-02-13 | End: 2023-02-13 | Stop reason: SDUPTHER

## 2023-02-13 RX ORDER — MIDAZOLAM HYDROCHLORIDE 2 MG/2ML
2 INJECTION, SOLUTION INTRAMUSCULAR; INTRAVENOUS
Status: DISCONTINUED | OUTPATIENT
Start: 2023-02-13 | End: 2023-02-13 | Stop reason: HOSPADM

## 2023-02-13 RX ORDER — NEOSTIGMINE METHYLSULFATE 1 MG/ML
INJECTION, SOLUTION INTRAVENOUS PRN
Status: DISCONTINUED | OUTPATIENT
Start: 2023-02-13 | End: 2023-02-13 | Stop reason: SDUPTHER

## 2023-02-13 RX ORDER — SODIUM CHLORIDE 0.9 % (FLUSH) 0.9 %
5-40 SYRINGE (ML) INJECTION EVERY 12 HOURS SCHEDULED
Status: DISCONTINUED | OUTPATIENT
Start: 2023-02-13 | End: 2023-02-13 | Stop reason: HOSPADM

## 2023-02-13 RX ORDER — ROCURONIUM BROMIDE 10 MG/ML
INJECTION, SOLUTION INTRAVENOUS PRN
Status: DISCONTINUED | OUTPATIENT
Start: 2023-02-13 | End: 2023-02-13 | Stop reason: SDUPTHER

## 2023-02-13 RX ORDER — LIDOCAINE HYDROCHLORIDE AND EPINEPHRINE 10; 10 MG/ML; UG/ML
INJECTION, SOLUTION INFILTRATION; PERINEURAL PRN
Status: DISCONTINUED | OUTPATIENT
Start: 2023-02-13 | End: 2023-02-13 | Stop reason: HOSPADM

## 2023-02-13 RX ORDER — LIDOCAINE HYDROCHLORIDE 10 MG/ML
1 INJECTION, SOLUTION INFILTRATION; PERINEURAL
Status: DISCONTINUED | OUTPATIENT
Start: 2023-02-13 | End: 2023-02-13 | Stop reason: HOSPADM

## 2023-02-13 RX ORDER — ONDANSETRON 2 MG/ML
INJECTION INTRAMUSCULAR; INTRAVENOUS PRN
Status: DISCONTINUED | OUTPATIENT
Start: 2023-02-13 | End: 2023-02-13 | Stop reason: SDUPTHER

## 2023-02-13 RX ORDER — SODIUM CHLORIDE 9 MG/ML
INJECTION, SOLUTION INTRAVENOUS PRN
Status: DISCONTINUED | OUTPATIENT
Start: 2023-02-13 | End: 2023-02-13 | Stop reason: HOSPADM

## 2023-02-13 RX ORDER — DOCUSATE SODIUM 100 MG/1
100 CAPSULE, LIQUID FILLED ORAL 2 TIMES DAILY
Qty: 60 CAPSULE | Refills: 0 | Status: SHIPPED | OUTPATIENT
Start: 2023-02-13 | End: 2023-03-15

## 2023-02-13 RX ADMIN — ACETAMINOPHEN 1000 MG: 500 TABLET, FILM COATED ORAL at 09:45

## 2023-02-13 RX ADMIN — Medication 3 MG: at 11:43

## 2023-02-13 RX ADMIN — SODIUM CHLORIDE, POTASSIUM CHLORIDE, SODIUM LACTATE AND CALCIUM CHLORIDE: 600; 310; 30; 20 INJECTION, SOLUTION INTRAVENOUS at 09:46

## 2023-02-13 RX ADMIN — HYDROMORPHONE HYDROCHLORIDE 0.5 MG: 2 INJECTION, SOLUTION INTRAMUSCULAR; INTRAVENOUS; SUBCUTANEOUS at 11:58

## 2023-02-13 RX ADMIN — Medication 2 G: at 11:01

## 2023-02-13 RX ADMIN — PROPOFOL 200 MG: 10 INJECTION, EMULSION INTRAVENOUS at 10:57

## 2023-02-13 RX ADMIN — ONDANSETRON 4 MG: 2 INJECTION INTRAMUSCULAR; INTRAVENOUS at 11:07

## 2023-02-13 RX ADMIN — ROCURONIUM BROMIDE 35 MG: 50 INJECTION, SOLUTION INTRAVENOUS at 10:57

## 2023-02-13 RX ADMIN — ONDANSETRON 4 MG: 2 INJECTION INTRAMUSCULAR; INTRAVENOUS at 12:43

## 2023-02-13 RX ADMIN — PROCHLORPERAZINE EDISYLATE 5 MG: 5 INJECTION INTRAMUSCULAR; INTRAVENOUS at 13:18

## 2023-02-13 RX ADMIN — LIDOCAINE HYDROCHLORIDE 100 MG: 20 INJECTION, SOLUTION EPIDURAL; INFILTRATION; INTRACAUDAL; PERINEURAL at 10:57

## 2023-02-13 RX ADMIN — GLYCOPYRROLATE 0.4 MG: 0.2 INJECTION INTRAMUSCULAR; INTRAVENOUS at 11:43

## 2023-02-13 RX ADMIN — HYDROMORPHONE HYDROCHLORIDE 0.5 MG: 2 INJECTION, SOLUTION INTRAMUSCULAR; INTRAVENOUS; SUBCUTANEOUS at 12:17

## 2023-02-13 RX ADMIN — DEXAMETHASONE SODIUM PHOSPHATE 10 MG: 10 INJECTION INTRAMUSCULAR; INTRAVENOUS at 11:07

## 2023-02-13 RX ADMIN — FENTANYL CITRATE 100 MCG: 50 INJECTION, SOLUTION INTRAMUSCULAR; INTRAVENOUS at 10:54

## 2023-02-13 RX ADMIN — HYDROMORPHONE HYDROCHLORIDE 0.5 MG: 2 INJECTION, SOLUTION INTRAMUSCULAR; INTRAVENOUS; SUBCUTANEOUS at 12:12

## 2023-02-13 ASSESSMENT — PAIN - FUNCTIONAL ASSESSMENT: PAIN_FUNCTIONAL_ASSESSMENT: 0-10

## 2023-02-13 ASSESSMENT — PAIN SCALES - GENERAL
PAINLEVEL_OUTOF10: 8
PAINLEVEL_OUTOF10: 8
PAINLEVEL_OUTOF10: 7

## 2023-02-13 ASSESSMENT — PAIN DESCRIPTION - LOCATION: LOCATION: ABDOMEN

## 2023-02-13 NOTE — ANESTHESIA PRE PROCEDURE
Department of Anesthesiology  Preprocedure Note       Name:  Pina Luo   Age:  79 y.o.  :  1955                                          MRN:  [id number]         Date:  2023      Surgeon: Hung Mcdonough):  Lisa Pineda DO    Procedure: Procedure(s): HERNIA INGUINAL REPAIR LAPAROSCOPIC ROBOTIC LEFT    Medications prior to admission:   Prior to Admission medications    Medication Sig Start Date End Date Taking?  Authorizing Provider   ibuprofen (ADVIL;MOTRIN) 200 MG tablet Take 400 mg by mouth every 6 hours as needed  Patient not taking: Reported on 2023    Historical Provider, MD       Current medications:    Current Facility-Administered Medications   Medication Dose Route Frequency Provider Last Rate Last Admin    lidocaine 1 % injection 1 mL  1 mL IntraDERmal Once PRN NORM Hewitt MD        acetaminophen (TYLENOL) tablet 1,000 mg  1,000 mg Oral Once L Konrad Hewitt MD        lactated ringers IV soln infusion   IntraVENous Continuous L Konrad Hewitt MD        sodium chloride flush 0.9 % injection 5-40 mL  5-40 mL IntraVENous 2 times per day NORM Hewitt MD        sodium chloride flush 0.9 % injection 5-40 mL  5-40 mL IntraVENous PRN NORM Hewitt MD        0.9 % sodium chloride infusion   IntraVENous PRN NORM Hewitt MD        midazolam PF (VERSED) injection 2 mg  2 mg IntraVENous Once PRN NORM Hewitt MD        sodium chloride flush 0.9 % injection 5-40 mL  5-40 mL IntraVENous 2 times per day Lisa Pineda DO        sodium chloride flush 0.9 % injection 5-40 mL  5-40 mL IntraVENous PRN Lisa Pineda DO        0.9 % sodium chloride infusion   IntraVENous PRN Lisa Pineda DO        ceFAZolin (ANCEF) 2000 mg in sterile water 20 mL IV syringe  2,000 mg IntraVENous On Call to [address] [place] [address]           Allergies:  No Known Allergies    Problem List:    Patient Active Problem List   Diagnosis Code    Elevated blood-pressure reading without diagnosis of hypertension R03.0    Inguinal hernia K40.90       Past Medical History:        Diagnosis Date    Former cigarette smoker     Inguinal hernia     left       Past Surgical History:        Procedure Laterality Date    CHOLECYSTECTOMY      COLONOSCOPY         Social History:    Social History     Tobacco Use    Smoking status: Former     Packs/day: 1.00     Years: 40.00     Pack years: 40.00     Types: Cigarettes     Quit date: 2006     Years since quittin.6    Smokeless tobacco: Never   Substance Use Topics    Alcohol use: Yes     Alcohol/week: 7.0 standard drinks     Types: 7 Glasses of wine per week     Comment: glass of wine with dinner                                Counseling given: Not Answered      Vital Signs (Current):   Vitals:    23 1014 23 0907   BP:  (!) 152/64   Pulse:  78   Resp:  12   Temp:  97.5 °F (36.4 °C)   TempSrc:  Oral   SpO2:  100%   Weight: 145 lb (65.8 kg) 148 lb 6.4 oz (67.3 kg)   Height: 5' 1\" (1.549 m) 5' 1\" (1.549 m)                                              BP Readings from Last 3 Encounters:   23 (!) 152/64   23 (!) 140/80   23 (!) 160/78       NPO Status: Time of last liquid consumption: 0000                        Time of last solid consumption: 0000                        Date of last liquid consumption: 23                        Date of last solid food consumption: 23    BMI:   Wt Readings from Last 3 Encounters:   23 148 lb 6.4 oz (67.3 kg)   23 148 lb (67.1 kg)   23 148 lb 3.2 oz (67.2 kg)     Body mass index is 28.04 kg/m².     CBC:   Lab Results   Component Value Date/Time    WBC 4.3 2022 11:00 AM    RBC 4.56 2022 11:00 AM    HGB 13.5 2022 11:00 AM    HCT 41.3 2022 11:00 AM    MCV 91 2022 11:00 AM    RDW 12.4 2022 11:00 AM     2022 11:00 AM       CMP:   Lab Results   Component Value Date/Time    NA 141 05/04/2022 11:00 AM    K 4.2 05/04/2022 11:00 AM     05/04/2022 11:00 AM    CO2 22 05/04/2022 11:00 AM    BUN 14 05/04/2022 11:00 AM    CREATININE 0.48 05/04/2022 11:00 AM    AGRATIO 1.8 05/04/2022 11:00 AM    LABGLOM 104 05/04/2022 11:00 AM    GLUCOSE 99 05/04/2022 11:00 AM    PROT 7.3 05/04/2022 11:00 AM    CALCIUM 9.3 05/04/2022 11:00 AM    BILITOT 0.4 05/04/2022 11:00 AM    ALKPHOS 121 05/04/2022 11:00 AM    AST 16 05/04/2022 11:00 AM    ALT 24 05/04/2022 11:00 AM       POC Tests: No results for input(s): POCGLU, POCNA, POCK, POCCL, POCBUN, POCHEMO, POCHCT in the last 72 hours. Coags: No results found for: PROTIME, INR, APTT    HCG (If Applicable): No results found for: PREGTESTUR, PREGSERUM, HCG, HCGQUANT     ABGs: No results found for: PHART, PO2ART, CCJ0ZSN, ATB4FCF, BEART, E9QLRWKE     Type & Screen (If Applicable):  No results found for: LABABO, LABRH    Drug/Infectious Status (If Applicable):  Lab Results   Component Value Date/Time    HEPCAB <0.1 05/04/2022 11:00 AM       COVID-19 Screening (If Applicable): No results found for: COVID19        Anesthesia Evaluation  Patient summary reviewed  Airway: Mallampati: II  TM distance: >3 FB   Neck ROM: full     Dental: normal exam     Comment: Upper permament retainer    Pulmonary:Negative Pulmonary ROS and normal exam  breath sounds clear to auscultation                             Cardiovascular:Negative CV ROS  Exercise tolerance: good (>4 METS),           Rhythm: regular  Rate: normal                 ROS comment: Pt denies recent CP, SOB or changes in functional status     Neuro/Psych:   Negative Neuro/Psych ROS              GI/Hepatic/Renal: Neg GI/Hepatic/Renal ROS            Endo/Other: Negative Endo/Other ROS                    Abdominal:              PE comment: Deferred   Vascular: negative vascular ROS. Other Findings:           Anesthesia Plan      general     ASA 1       Induction: intravenous.       Anesthetic plan and risks discussed with patient.                         Geena Colni MD   2/13/2023

## 2023-02-13 NOTE — DISCHARGE INSTRUCTIONS
DISCHARGE INSTRUCTIONS    Please call our office for a follow-up appointment in 1-2 week(s). Driving: No driving while taking pain medications    Activity: No strenous activity. Slowly advance as tolerated. No lifting greater than 20lbs. Diet:  Slowly advance as tolerated. Increase your fluids and fiber, as pain medications may cause constipation. No alcoholic beverages. Bathing: You may shower. No tub baths for 5 days. Dressing: If outer dressing, remove in 24 hours. Leave steri strips intact. Other:  Ice to incision as needed for pain. If drains present, empty and record    output daily. Call Dr. Liliana Ly if you have:  ? Temperature greater than 100.4  ? Persistent nausea and vomiting  ? Severe uncontrolled pain  ? Redness, tenderness, or signs of infection (pain, swelling, redness, odor or green/yellow discharge around the site)  ? Difficulty breathing, headache or visual disturbances  ? Hives  ? Persistent dizziness or light-headedness  ? Extreme fatigue  ? Any other questions or concerns you may have after discharge    In an emergency, call 911 or go to an Emergency Department at [facility] or [facility].    It is important to bring a complete, current list of your medications to any medical appointments or hospitalizations. I understand and acknowledge receipt of the above instructions. MEDICATION INTERACTION:  During your procedure you potentially received a medication or medications which may reduce the effectiveness of oral contraceptives. Please consider other forms of contraception for 1 month following your procedure if you are currently using oral contraceptives as your primary form of birth control.  In addition to this, we recommend continuing your oral contraceptive as prescribed, unless otherwise instructed by your physician, during this time    After general anesthesia or intravenous sedation, for 24 hours or while taking prescription Narcotics:  Limit your activities  A responsible adult needs to be with you for the next 24 hours  Do not drive and operate hazardous machinery  Do not make important personal or business decisions  Do not drink alcoholic beverages  If you have not urinated within 8 hours after discharge, and you are experiencing discomfort from urinary retention, please go to the nearest ED. If you have sleep apnea and have a CPAP machine, please use it for all naps and sleeping. Please use caution when taking narcotics and any of your home medications that may cause drowsiness. *  Please give a list of your current medications to your Primary Care Provider. *  Please update this list whenever your medications are discontinued, doses are      changed, or new medications (including over-the-counter products) are added. *  Please carry medication information at all times in case of emergency situations. These are general instructions for a healthy lifestyle:  No smoking/ No tobacco products/ Avoid exposure to second hand smoke  Surgeon General's Warning:  Quitting smoking now greatly reduces serious risk to your health. Obesity, smoking, and sedentary lifestyle greatly increases your risk for illness  A healthy diet, regular physical exercise & weight monitoring are important for maintaining a healthy lifestyle    You may be retaining fluid if you have a history of heart failure or if you experience any of the following symptoms:  Weight gain of 3 pounds or more overnight or 5 pounds in a week, increased swelling in our hands or feet or shortness of breath while lying flat in bed. Please call your doctor as soon as you notice any of these symptoms; do not wait until your next office visit.

## 2023-02-13 NOTE — PERIOP NOTE
Pt and  Ronelr Des given discharge instructions at bedside, both verbalize understanding. All questions answered.

## 2023-02-13 NOTE — ANESTHESIA POSTPROCEDURE EVALUATION
Department of Anesthesiology  Postprocedure Note    Patient: Bebeto Garcia  MRN: [id number]  YOB: 1955  Date of evaluation: 2/13/2023      Procedure Summary     Date: 02/13/23 Room / Location: [facility] MAIN OR  / [facility] MAIN OR    Anesthesia Start: 1049 Anesthesia Stop: 9221    Procedure:  HERNIA INGUINAL REPAIR LAPAROSCOPIC ROBOTIC LEFT (Left: Abdomen) Diagnosis:       Inguinal hernia      (Inguinal hernia [K40.90])    Providers: Radha Escamilla DO Responsible Provider: Jonathon Thompson MD    Anesthesia Type: General ASA Status: 1          Anesthesia Type: General    Dana Phase I: Dana Score: 8    Dana Phase II: Dana Score: 10      Anesthesia Post Evaluation    Patient location during evaluation: PACU  Patient participation: complete - patient participated  Level of consciousness: awake and alert  Airway patency: patent  Nausea & Vomiting: no nausea and no vomiting  Complications: no  Cardiovascular status: hemodynamically stable  Respiratory status: acceptable, nonlabored ventilation and spontaneous ventilation  Hydration status: euvolemic  Comments: BP (!) 113/56   Pulse 64   Temp 97.8 °F (36.6 °C) (Temporal)   Resp 16   Ht 5' 1\" (1.549 m)   Wt 148 lb 6.4 oz (67.3 kg)   LMP  (LMP Unknown)   SpO2 95%   BMI 28.04 kg/m²     Multimodal analgesia pain management approach

## 2023-02-13 NOTE — OP NOTE
Laparoscopic Robotic Inguinal Herniorrhaphy with Mesh Procedure Note      Name:  Gris Frazier Date/Time of Admission: 2023  8:45 AM  CSN: [id number]  Attending Provider: Remberto Tucker, Jose  MRN:  [id number]  : 1955 79 y.o. Pre-operative Diagnosis: left Inguinal Hernia    Post-operative Diagnosis: left Inguinal Hernia    Procedure: Laparoscopic Robotic left inguinal herniorrhaphy with mesh    Surgeon: Fadumo Patton DO    Anesthesia: General    Indications: Gris Frazier is a 79 y.o. female with a symptomatic left inguinal hernia. Procedure Details: The patient was correctly identified in preoperative holding area. Consent was confirmed and placed on the chart. Preoperative antibiotics were administered per SCIP protocol. The patient was then taken back to the operative suite and placed in the supine position. General anesthesia was performed per anesthesia via an endotracheal tube. The patient's groin was then prepped and draped in the usual sterile fashion. A timeout was performed and all members of the team that were present were in agreement. The procedure began by locating a spot approximately 2 fingerbreadths below the patient's left subcostal margin here this area was locally anesthetized. Next using a 15 blade scalpel and incision was made through skin down subcutaneous tissues below. A 5 mm Optiview trocar with a 5 mm 0° laparoscope was placed into this incision and the abdomen was entered under direct visualization while visualizing all layers of the abdominal wall. After entry into the abdomen, insufflation was attached. The obturator was removed and a 5 mm 30° laparoscopic camera was reinserted. Pneumoperitoneum was achieved and the contents of the abdomen were inspected and found to be free of any defects or harm. At this point in time a thorough investigation of the abdomen was entertained.   The patient was noted to have an inguinal hernia. Given this an additional two 8 mm trocar was then placed across the abdomen after previous anesthetization and under direct visualization, I also upsized the 5mm trocar for 8mm robotic trocar. The robot was docked. I then began by reducing the hernia. I then took down the peritoneum and reducing the hernia and sac. Pt had an indirect and femoral hernia. A progrip mesh was placed in the abdomen and fixed across the hernia defect and coopers ligament. I then closed the peritoneum with 2-0 v-lock. Following this hemostasis was assessed and noted to be adequate. Therefore pneumoperitoneum was desufflated and all trochars removed under direct visualization. 3-0 Monocryl suture was used to close the skin of all incisions in a simple interrupted fashion. Mastisol and Steri-Strips were to all incisions. At this time, the procedure was complete. At the conclusion of the case all sponge needles and instrument counts were correct. The patient tolerated well and was taken to the [address] [place] [address] Unit. They will be given outpatient follow-up in approximately 1-2 weeks.

## 2023-03-02 ENCOUNTER — OFFICE VISIT (OUTPATIENT)
Dept: SURGERY | Age: 68
End: 2023-03-02

## 2023-03-02 DIAGNOSIS — Z09 POSTOPERATIVE EXAMINATION: Primary | ICD-10-CM

## 2023-03-02 DIAGNOSIS — K40.90 UNILATERAL INGUINAL HERNIA WITHOUT OBSTRUCTION OR GANGRENE, RECURRENCE NOT SPECIFIED: ICD-10-CM

## 2023-03-02 PROCEDURE — 99024 POSTOP FOLLOW-UP VISIT: CPT

## 2023-03-02 NOTE — PROGRESS NOTES
[address] [facility]  [phone]        poDate: 3/2/2023      Name: Shirlene Leon      MRN: [id number]       : 1955       Age: 76 y.o. Sex: female        JUDI Winston - CNP       CC:  postoperative visit       HPI:  The patient presents for a post-op visit s/p Laparoscopic Robotic left inguinal herniorrhaphy with mesh. Guy Teresa, DO.23   No complaints of  Physical Exam:     LMP  (LMP Unknown)     General: Alert, oriented, cooperative and in no acute distress. Neck: Supple, trachea midline, no appreciable thyromegaly  Resp: No JVD. Breathing is  non-labored. Lungs clear to auscultation without wheezing or rhonchi   CV: RRR. No murmurs, rubs or gallops appreciated. Abd: soft non-tender and non-distended without peritoneal signs. +bs    Skin/incision: Laparoscopic sites are clean, dry and intact with no signs of infection. No fluid collections palpated Steri-Strips removed    Assessment/Plan:  Shirlene Leon is a 76 y.o. female who is s/p   Laparoscopic Robotic left inguinal herniorrhaphy with mesh. Guy Teresa, DO.23    1. Follow-up as needed    2. For 3 weeks after surgery, lift nothing heavier than 25 lbs. 3 weeks after surgery, Return to full activity    3. Regular diet  4. Do not get constipated. No more than 2 days without a bm. If 2 days no bm, then take colace stool softener twice a day. If 24 hours of colace does not produce a bm , then keep taking colace and add miralax laxative twice a day until you have a bm. Once you are having regular bms, you can use colace and miralax as needed.      Signed: JUDI Ellington - PEDRO    3/2/2023  12:00 PM

## 2023-03-02 NOTE — PATIENT INSTRUCTIONS
1. Follow-up as needed    2. For 3 weeks after surgery, lift nothing heavier than 25 lbs. 3 weeks after surgery, Return to full activity    3. Regular diet  4. Do not get constipated. No more than 2 days without a bm. If 2 days no bm, then take colace stool softener twice a day. If 24 hours of colace does not produce a bm , then keep taking colace and add miralax laxative twice a day until you have a bm. Once you are having regular bms, you can use colace and miralax as needed.

## 2023-04-20 ENCOUNTER — TELEPHONE (OUTPATIENT)
Dept: FAMILY MEDICINE CLINIC | Facility: CLINIC | Age: 68
End: 2023-04-20

## 2023-04-26 ENCOUNTER — TRANSCRIBE ORDERS (OUTPATIENT)
Dept: SCHEDULING | Age: 68
End: 2023-04-26

## 2023-04-26 DIAGNOSIS — Z12.31 SCREENING MAMMOGRAM FOR HIGH-RISK PATIENT: Primary | ICD-10-CM

## 2023-05-11 ENCOUNTER — OFFICE VISIT (OUTPATIENT)
Dept: FAMILY MEDICINE CLINIC | Facility: CLINIC | Age: 68
End: 2023-05-11
Payer: MEDICARE

## 2023-05-11 VITALS
SYSTOLIC BLOOD PRESSURE: 132 MMHG | HEIGHT: 61 IN | BODY MASS INDEX: 27.68 KG/M2 | OXYGEN SATURATION: 98 % | WEIGHT: 146.6 LBS | DIASTOLIC BLOOD PRESSURE: 70 MMHG | TEMPERATURE: 98.3 F

## 2023-05-11 DIAGNOSIS — R79.9 ABNORMAL FINDING OF BLOOD CHEMISTRY, UNSPECIFIED: ICD-10-CM

## 2023-05-11 DIAGNOSIS — Z12.11 SCREENING FOR COLON CANCER: ICD-10-CM

## 2023-05-11 DIAGNOSIS — R03.0 ELEVATED BLOOD-PRESSURE READING WITHOUT DIAGNOSIS OF HYPERTENSION: ICD-10-CM

## 2023-05-11 DIAGNOSIS — Z00.00 MEDICARE ANNUAL WELLNESS VISIT, SUBSEQUENT: Primary | ICD-10-CM

## 2023-05-11 LAB
BASOPHILS # BLD: 0.1 K/UL (ref 0–0.2)
BASOPHILS NFR BLD: 1 % (ref 0–2)
DIFFERENTIAL METHOD BLD: NORMAL
EOSINOPHIL # BLD: 0.1 K/UL (ref 0–0.8)
EOSINOPHIL NFR BLD: 1 % (ref 0.5–7.8)
ERYTHROCYTE [DISTWIDTH] IN BLOOD BY AUTOMATED COUNT: 13 % (ref 11.9–14.6)
HCT VFR BLD AUTO: 41.1 % (ref 35.8–46.3)
HGB BLD-MCNC: 13 G/DL (ref 11.7–15.4)
IMM GRANULOCYTES # BLD AUTO: 0 K/UL (ref 0–0.5)
IMM GRANULOCYTES NFR BLD AUTO: 0 % (ref 0–5)
LYMPHOCYTES # BLD: 1.1 K/UL (ref 0.5–4.6)
LYMPHOCYTES NFR BLD: 17 % (ref 13–44)
MCH RBC QN AUTO: 28.6 PG (ref 26.1–32.9)
MCHC RBC AUTO-ENTMCNC: 31.6 G/DL (ref 31.4–35)
MCV RBC AUTO: 90.5 FL (ref 82–102)
MONOCYTES # BLD: 0.4 K/UL (ref 0.1–1.3)
MONOCYTES NFR BLD: 6 % (ref 4–12)
NEUTS SEG # BLD: 4.6 K/UL (ref 1.7–8.2)
NEUTS SEG NFR BLD: 75 % (ref 43–78)
NRBC # BLD: 0 K/UL (ref 0–0.2)
PLATELET # BLD AUTO: 177 K/UL (ref 150–450)
PMV BLD AUTO: 11 FL (ref 9.4–12.3)
RBC # BLD AUTO: 4.54 M/UL (ref 4.05–5.2)
WBC # BLD AUTO: 6.2 K/UL (ref 4.3–11.1)

## 2023-05-11 PROCEDURE — G0439 PPPS, SUBSEQ VISIT: HCPCS | Performed by: FAMILY MEDICINE

## 2023-05-11 PROCEDURE — 1123F ACP DISCUSS/DSCN MKR DOCD: CPT | Performed by: FAMILY MEDICINE

## 2023-05-11 RX ORDER — AMOXICILLIN 500 MG/1
CAPSULE ORAL
COMMUNITY
Start: 2023-04-19 | End: 2023-05-11

## 2023-05-11 RX ORDER — ALBUTEROL SULFATE 90 UG/1
2 AEROSOL, METERED RESPIRATORY (INHALATION) 4 TIMES DAILY PRN
Qty: 54 G | Refills: 1 | Status: SHIPPED | OUTPATIENT
Start: 2023-05-11

## 2023-05-11 RX ORDER — IBUPROFEN 600 MG/1
TABLET ORAL
COMMUNITY
Start: 2023-05-01 | End: 2023-05-11

## 2023-05-11 RX ORDER — HYDROCODONE BITARTRATE AND ACETAMINOPHEN 5; 325 MG/1; MG/1
TABLET ORAL
COMMUNITY
Start: 2023-05-01 | End: 2023-05-11

## 2023-05-11 SDOH — ECONOMIC STABILITY: HOUSING INSECURITY
IN THE LAST 12 MONTHS, WAS THERE A TIME WHEN YOU DID NOT HAVE A STEADY PLACE TO SLEEP OR SLEPT IN A SHELTER (INCLUDING NOW)?: NO

## 2023-05-11 SDOH — ECONOMIC STABILITY: INCOME INSECURITY: HOW HARD IS IT FOR YOU TO PAY FOR THE VERY BASICS LIKE FOOD, HOUSING, MEDICAL CARE, AND HEATING?: NOT VERY HARD

## 2023-05-11 SDOH — ECONOMIC STABILITY: FOOD INSECURITY: WITHIN THE PAST 12 MONTHS, THE FOOD YOU BOUGHT JUST DIDN'T LAST AND YOU DIDN'T HAVE MONEY TO GET MORE.: NEVER TRUE

## 2023-05-11 SDOH — ECONOMIC STABILITY: FOOD INSECURITY: WITHIN THE PAST 12 MONTHS, YOU WORRIED THAT YOUR FOOD WOULD RUN OUT BEFORE YOU GOT MONEY TO BUY MORE.: NEVER TRUE

## 2023-05-11 ASSESSMENT — PATIENT HEALTH QUESTIONNAIRE - PHQ9
SUM OF ALL RESPONSES TO PHQ QUESTIONS 1-9: 2
SUM OF ALL RESPONSES TO PHQ QUESTIONS 1-9: 2
SUM OF ALL RESPONSES TO PHQ9 QUESTIONS 1 & 2: 2
SUM OF ALL RESPONSES TO PHQ QUESTIONS 1-9: 2
SUM OF ALL RESPONSES TO PHQ QUESTIONS 1-9: 2
2. FEELING DOWN, DEPRESSED OR HOPELESS: 2
1. LITTLE INTEREST OR PLEASURE IN DOING THINGS: 0

## 2023-05-11 ASSESSMENT — LIFESTYLE VARIABLES
HOW MANY STANDARD DRINKS CONTAINING ALCOHOL DO YOU HAVE ON A TYPICAL DAY: 1 OR 2
HOW OFTEN DO YOU HAVE A DRINK CONTAINING ALCOHOL: 2-4 TIMES A MONTH

## 2023-05-11 NOTE — PATIENT INSTRUCTIONS
Personalized Preventive Plan for Ashely Ramos - 5/11/2023  Medicare offers a range of preventive health benefits. Some of the tests and screenings are paid in full while other may be subject to a deductible, co-insurance, and/or copay. Some of these benefits include a comprehensive review of your medical history including lifestyle, illnesses that may run in your family, and various assessments and screenings as appropriate. After reviewing your medical record and screening and assessments performed today your provider may have ordered immunizations, labs, imaging, and/or referrals for you. A list of these orders (if applicable) as well as your Preventive Care list are included within your After Visit Summary for your review. Other Preventive Recommendations:    A preventive eye exam performed by an eye specialist is recommended every 1-2 years to screen for glaucoma; cataracts, macular degeneration, and other eye disorders. A preventive dental visit is recommended every 6 months. Try to get at least 150 minutes of exercise per week or 10,000 steps per day on a pedometer . Order or download the FREE \"Exercise & Physical Activity: Your Everyday Guide\" from [employer]. Call 7-800.390.8316 or search [employer] online. You need 0768-6384 mg of calcium and 0806-2351 IU of vitamin D per day. It is possible to meet your calcium requirement with diet alone, but a vitamin D supplement is usually necessary to meet this goal.  When exposed to the sun, use a sunscreen that protects against both UVA and UVB radiation with an SPF of 30 or greater. Reapply every 2 to 3 hours or after sweating, drying off with a towel, or swimming. Always wear a seat belt when traveling in a car. Always wear a helmet when riding a bicycle or motorcycle.

## 2023-05-11 NOTE — PROGRESS NOTES
Bettie Denver (: 1955) is a 76 y.o. female, established patient, here for evaluation of the following chief complaint(s):  Follow-up Chronic Condition (Pt had dental work done a couple of weeks ago  Today she was eating lunch and the tooth broke.  ), Medicare AWV, and Shortness of Breath (Patient is noticing more SOB when she exerts herself like going up the stairs )       ASSESSMENT/PLAN:  1. Medicare annual wellness visit, subsequent  -     CBC with Auto Differential; Future  -     Comprehensive Metabolic Panel; Future  -     Lipid Panel; Future  2. Screening for colon cancer  -     POCT FECAL IMMUNOCHEMICAL TEST (FIT); Future  3. Elevated blood-pressure reading without diagnosis of hypertension  -     CBC with Auto Differential; Future  -     Comprehensive Metabolic Panel; Future  -     Lipid Panel; Future  4. Abnormal finding of blood chemistry, unspecified  -     Lipid Panel; Future      No follow-ups on file. Medicare wellness visit completed  Refill chronic medications  Check labs today  See dentist every 6 months  See eye physician or get eyes checked every 1-2 years  Immunizations reviewed and discussed with patient  Return for wellness visit in 1 year    SUBJECTIVE/OBJECTIVE:  HPI  Some mild shortness of breath when going up stairs quickly, does have history of smoking but has quit 16 years ago,  Walks for 5 to 8 miles daily,    Physical Exam  Vitals and nursing note reviewed. Constitutional:       General: She is not in acute distress. Appearance: Normal appearance. She is not ill-appearing. HENT:      Head: Normocephalic and atraumatic. Right Ear: External ear normal.      Left Ear: External ear normal.      Nose: Nose normal.      Mouth/Throat:      Mouth: Mucous membranes are dry. Eyes:      Extraocular Movements: Extraocular movements intact. Pupils: Pupils are equal, round, and reactive to light.    Cardiovascular:      Rate and Rhythm: Normal rate and regular

## 2023-05-12 LAB
ALBUMIN SERPL-MCNC: 4 G/DL (ref 3.2–4.6)
ALBUMIN/GLOB SERPL: 1.2 (ref 0.4–1.6)
ALP SERPL-CCNC: 143 U/L (ref 50–136)
ALT SERPL-CCNC: 30 U/L (ref 12–65)
ANION GAP SERPL CALC-SCNC: 6 MMOL/L (ref 2–11)
AST SERPL-CCNC: 15 U/L (ref 15–37)
BILIRUB SERPL-MCNC: 0.2 MG/DL (ref 0.2–1.1)
BUN SERPL-MCNC: 16 MG/DL (ref 8–23)
CALCIUM SERPL-MCNC: 9.3 MG/DL (ref 8.3–10.4)
CHLORIDE SERPL-SCNC: 107 MMOL/L (ref 101–110)
CHOLEST SERPL-MCNC: 209 MG/DL
CO2 SERPL-SCNC: 27 MMOL/L (ref 21–32)
CREAT SERPL-MCNC: 0.5 MG/DL (ref 0.6–1)
GLOBULIN SER CALC-MCNC: 3.3 G/DL (ref 2.8–4.5)
GLUCOSE SERPL-MCNC: 101 MG/DL (ref 65–100)
HDLC SERPL-MCNC: 81 MG/DL (ref 40–60)
HDLC SERPL: 2.6
LDLC SERPL CALC-MCNC: 108.6 MG/DL
POTASSIUM SERPL-SCNC: 3.8 MMOL/L (ref 3.5–5.1)
PROT SERPL-MCNC: 7.3 G/DL (ref 6.3–8.2)
SODIUM SERPL-SCNC: 140 MMOL/L (ref 133–143)
TRIGL SERPL-MCNC: 97 MG/DL (ref 35–150)
VLDLC SERPL CALC-MCNC: 19.4 MG/DL (ref 6–23)

## 2023-05-23 ENCOUNTER — HOSPITAL ENCOUNTER (OUTPATIENT)
Dept: MAMMOGRAPHY | Age: 68
Discharge: HOME | End: 2023-05-26
Payer: MEDICARE

## 2023-05-23 DIAGNOSIS — Z12.31 SCREENING MAMMOGRAM FOR HIGH-RISK PATIENT: ICD-10-CM

## 2023-05-23 PROCEDURE — 77067 SCR MAMMO BI INCL CAD: CPT

## 2024-04-30 ENCOUNTER — TRANSCRIBE ORDERS (OUTPATIENT)
Dept: SCHEDULING | Age: 69
End: 2024-04-30

## 2024-04-30 DIAGNOSIS — Z12.31 ENCOUNTER FOR SCREENING MAMMOGRAM FOR MALIGNANT NEOPLASM OF BREAST: Primary | ICD-10-CM

## 2024-06-17 SDOH — ECONOMIC STABILITY: FOOD INSECURITY: WITHIN THE PAST 12 MONTHS, YOU WORRIED THAT YOUR FOOD WOULD RUN OUT BEFORE YOU GOT MONEY TO BUY MORE.: NEVER TRUE

## 2024-06-17 SDOH — ECONOMIC STABILITY: FOOD INSECURITY: WITHIN THE PAST 12 MONTHS, THE FOOD YOU BOUGHT JUST DIDN'T LAST AND YOU DIDN'T HAVE MONEY TO GET MORE.: NEVER TRUE

## 2024-06-17 SDOH — HEALTH STABILITY: PHYSICAL HEALTH: ON AVERAGE, HOW MANY DAYS PER WEEK DO YOU ENGAGE IN MODERATE TO STRENUOUS EXERCISE (LIKE A BRISK WALK)?: 6 DAYS

## 2024-06-17 SDOH — ECONOMIC STABILITY: TRANSPORTATION INSECURITY
IN THE PAST 12 MONTHS, HAS LACK OF TRANSPORTATION KEPT YOU FROM MEETINGS, WORK, OR FROM GETTING THINGS NEEDED FOR DAILY LIVING?: NO

## 2024-06-17 SDOH — ECONOMIC STABILITY: INCOME INSECURITY: HOW HARD IS IT FOR YOU TO PAY FOR THE VERY BASICS LIKE FOOD, HOUSING, MEDICAL CARE, AND HEATING?: NOT HARD AT ALL

## 2024-06-17 SDOH — HEALTH STABILITY: PHYSICAL HEALTH: ON AVERAGE, HOW MANY MINUTES DO YOU ENGAGE IN EXERCISE AT THIS LEVEL?: 60 MIN

## 2024-06-17 ASSESSMENT — PATIENT HEALTH QUESTIONNAIRE - PHQ9
SUM OF ALL RESPONSES TO PHQ QUESTIONS 1-9: 0
1. LITTLE INTEREST OR PLEASURE IN DOING THINGS: NOT AT ALL
SUM OF ALL RESPONSES TO PHQ QUESTIONS 1-9: 0
2. FEELING DOWN, DEPRESSED OR HOPELESS: NOT AT ALL
SUM OF ALL RESPONSES TO PHQ QUESTIONS 1-9: 0
SUM OF ALL RESPONSES TO PHQ9 QUESTIONS 1 & 2: 0
SUM OF ALL RESPONSES TO PHQ QUESTIONS 1-9: 0

## 2024-06-17 ASSESSMENT — LIFESTYLE VARIABLES
HOW OFTEN DO YOU HAVE A DRINK CONTAINING ALCOHOL: 2-3 TIMES A WEEK
HOW MANY STANDARD DRINKS CONTAINING ALCOHOL DO YOU HAVE ON A TYPICAL DAY: 1 OR 2

## 2024-08-02 ENCOUNTER — OFFICE VISIT (OUTPATIENT)
Dept: FAMILY MEDICINE CLINIC | Facility: CLINIC | Age: 69
End: 2024-08-02
Payer: MEDICARE

## 2024-08-02 VITALS
SYSTOLIC BLOOD PRESSURE: 120 MMHG | HEIGHT: 61 IN | BODY MASS INDEX: 27.99 KG/M2 | TEMPERATURE: 99.1 F | WEIGHT: 148.25 LBS | DIASTOLIC BLOOD PRESSURE: 76 MMHG | HEART RATE: 95 BPM | OXYGEN SATURATION: 93 %

## 2024-08-02 DIAGNOSIS — H66.003 NON-RECURRENT ACUTE SUPPURATIVE OTITIS MEDIA OF BOTH EARS WITHOUT SPONTANEOUS RUPTURE OF TYMPANIC MEMBRANES: ICD-10-CM

## 2024-08-02 DIAGNOSIS — U07.1 COVID-19: Primary | ICD-10-CM

## 2024-08-02 DIAGNOSIS — R19.7 DIARRHEA, UNSPECIFIED TYPE: ICD-10-CM

## 2024-08-02 LAB
EXP DATE SOLUTION: NORMAL
EXP DATE SWAB: NORMAL
EXPIRATION DATE: NORMAL
LOT NUMBER POC: NORMAL
LOT NUMBER SOLUTION: NORMAL
LOT NUMBER SWAB: NORMAL
SARS-COV-2 RNA, POC: POSITIVE

## 2024-08-02 PROCEDURE — 99214 OFFICE O/P EST MOD 30 MIN: CPT

## 2024-08-02 PROCEDURE — 87635 SARS-COV-2 COVID-19 AMP PRB: CPT

## 2024-08-02 PROCEDURE — 1123F ACP DISCUSS/DSCN MKR DOCD: CPT

## 2024-08-02 RX ORDER — AMOXICILLIN AND CLAVULANATE POTASSIUM 875; 125 MG/1; MG/1
1 TABLET, FILM COATED ORAL 2 TIMES DAILY
Qty: 14 TABLET | Refills: 0 | Status: SHIPPED | OUTPATIENT
Start: 2024-08-02 | End: 2024-08-09

## 2024-08-02 RX ORDER — ONDANSETRON 4 MG/1
4 TABLET, ORALLY DISINTEGRATING ORAL 3 TIMES DAILY PRN
Qty: 21 TABLET | Refills: 0 | Status: SHIPPED | OUTPATIENT
Start: 2024-08-02

## 2024-08-02 ASSESSMENT — ENCOUNTER SYMPTOMS: DIARRHEA: 1

## 2024-08-02 NOTE — PATIENT INSTRUCTIONS
Cough:  --Honey 1 tsp throughout the day (can mix in hot teas with lemon)  --Cough drops  --Dayquil/Nyquil as needed (do not take additional tylenol when taking this medication)  --Be sure to hydrate well.     Sore throat:   --Salt water gargles (1/4 tsp salt in 8 oz of warm water)  --Lozenges/numbing sprays  --Warm fluids    Diarrhea:  --Imodium as needed  --Be sure to hydrate well.     Ears:  --Augmentin  --Would consider an antihistamine like Zyrtec or Claritin over the next 1-2 weeks. Would also consider Flonase nasal spray 2 sprays each nostril daily for 1-2 weeks.     Would recommend either tylenol or ibuprofen for any body aches, headaches, fevers.     Be sure to obtain Vitamin D supplement and take daily over the next 2-3 months.     Be sure to cough and deep breathe, move around intermittently.     If you develop chest pains, difficulties breathing, shortness of breath, or fever 103 or higher not responding to medications, please proceed to the ER.

## 2024-08-02 NOTE — PROGRESS NOTES
HENT:      Right Ear: Tympanic membrane is bulging.      Left Ear: Tympanic membrane is erythematous.   Cardiovascular:      Rate and Rhythm: Normal rate and regular rhythm.   Pulmonary:      Effort: Pulmonary effort is normal.      Breath sounds: Normal breath sounds.   Skin:     General: Skin is warm and dry.   Neurological:      Mental Status: She is alert and oriented to person, place, and time.         On this date 08/02/24  I have spent 30 minutes reviewing previous notes, test results and face to face with the patient discussing the diagnosis and importance of compliance with the treatment plan as well as documenting on the day of the visit.      An electronic signature was used to authenticate this note.  -- JUDI Duggan - CNP

## 2024-08-26 SDOH — HEALTH STABILITY: PHYSICAL HEALTH: ON AVERAGE, HOW MANY DAYS PER WEEK DO YOU ENGAGE IN MODERATE TO STRENUOUS EXERCISE (LIKE A BRISK WALK)?: 6 DAYS

## 2024-08-26 SDOH — HEALTH STABILITY: PHYSICAL HEALTH: ON AVERAGE, HOW MANY MINUTES DO YOU ENGAGE IN EXERCISE AT THIS LEVEL?: 60 MIN

## 2024-08-26 ASSESSMENT — LIFESTYLE VARIABLES
HOW OFTEN DO YOU HAVE SIX OR MORE DRINKS ON ONE OCCASION: 1
HOW MANY STANDARD DRINKS CONTAINING ALCOHOL DO YOU HAVE ON A TYPICAL DAY: 1 OR 2
HOW MANY STANDARD DRINKS CONTAINING ALCOHOL DO YOU HAVE ON A TYPICAL DAY: 1
HOW OFTEN DO YOU HAVE A DRINK CONTAINING ALCOHOL: 2-3 TIMES A WEEK
HOW OFTEN DO YOU HAVE A DRINK CONTAINING ALCOHOL: 4

## 2024-08-26 ASSESSMENT — PATIENT HEALTH QUESTIONNAIRE - PHQ9
SUM OF ALL RESPONSES TO PHQ QUESTIONS 1-9: 0
1. LITTLE INTEREST OR PLEASURE IN DOING THINGS: NOT AT ALL
2. FEELING DOWN, DEPRESSED OR HOPELESS: NOT AT ALL
SUM OF ALL RESPONSES TO PHQ QUESTIONS 1-9: 0
SUM OF ALL RESPONSES TO PHQ9 QUESTIONS 1 & 2: 0
SUM OF ALL RESPONSES TO PHQ QUESTIONS 1-9: 0
SUM OF ALL RESPONSES TO PHQ QUESTIONS 1-9: 0

## 2024-08-27 ENCOUNTER — OFFICE VISIT (OUTPATIENT)
Dept: FAMILY MEDICINE CLINIC | Facility: CLINIC | Age: 69
End: 2024-08-27
Payer: MEDICARE

## 2024-08-27 VITALS
SYSTOLIC BLOOD PRESSURE: 146 MMHG | HEIGHT: 61 IN | HEART RATE: 82 BPM | WEIGHT: 147.38 LBS | DIASTOLIC BLOOD PRESSURE: 88 MMHG | OXYGEN SATURATION: 99 % | BODY MASS INDEX: 27.83 KG/M2 | TEMPERATURE: 97.7 F

## 2024-08-27 DIAGNOSIS — R03.0 ELEVATED BLOOD PRESSURE READING WITHOUT DIAGNOSIS OF HYPERTENSION: ICD-10-CM

## 2024-08-27 DIAGNOSIS — Z23 NEED FOR PROPHYLACTIC VACCINATION AGAINST STREPTOCOCCUS PNEUMONIAE (PNEUMOCOCCUS): ICD-10-CM

## 2024-08-27 DIAGNOSIS — Z71.89 ACP (ADVANCE CARE PLANNING): ICD-10-CM

## 2024-08-27 DIAGNOSIS — Z13.1 SCREENING FOR DIABETES MELLITUS: ICD-10-CM

## 2024-08-27 DIAGNOSIS — E78.2 MIXED HYPERLIPIDEMIA: ICD-10-CM

## 2024-08-27 DIAGNOSIS — Z00.00 MEDICARE ANNUAL WELLNESS VISIT, SUBSEQUENT: Primary | ICD-10-CM

## 2024-08-27 LAB
ALBUMIN SERPL-MCNC: 3.9 G/DL (ref 3.2–4.6)
ALBUMIN/GLOB SERPL: 1.2 (ref 1–1.9)
ALP SERPL-CCNC: 141 U/L (ref 35–104)
ALT SERPL-CCNC: 21 U/L (ref 12–65)
ANION GAP SERPL CALC-SCNC: 10 MMOL/L (ref 9–18)
AST SERPL-CCNC: 20 U/L (ref 15–37)
BASOPHILS # BLD: 0.1 K/UL (ref 0–0.2)
BASOPHILS NFR BLD: 2 % (ref 0–2)
BILIRUB SERPL-MCNC: 0.3 MG/DL (ref 0–1.2)
BUN SERPL-MCNC: 12 MG/DL (ref 8–23)
CALCIUM SERPL-MCNC: 9.3 MG/DL (ref 8.8–10.2)
CHLORIDE SERPL-SCNC: 103 MMOL/L (ref 98–107)
CHOLEST SERPL-MCNC: 209 MG/DL (ref 0–200)
CO2 SERPL-SCNC: 26 MMOL/L (ref 20–28)
CREAT SERPL-MCNC: 0.53 MG/DL (ref 0.6–1.1)
DIFFERENTIAL METHOD BLD: NORMAL
EOSINOPHIL # BLD: 0.1 K/UL (ref 0–0.8)
EOSINOPHIL NFR BLD: 3 % (ref 0.5–7.8)
ERYTHROCYTE [DISTWIDTH] IN BLOOD BY AUTOMATED COUNT: 12.9 % (ref 11.9–14.6)
EST. AVERAGE GLUCOSE BLD GHB EST-MCNC: 113 MG/DL
GLOBULIN SER CALC-MCNC: 3.3 G/DL (ref 2.3–3.5)
GLUCOSE SERPL-MCNC: 108 MG/DL (ref 70–99)
HBA1C MFR BLD: 5.6 % (ref 0–5.6)
HCT VFR BLD AUTO: 40.4 % (ref 35.8–46.3)
HDLC SERPL-MCNC: 69 MG/DL (ref 40–60)
HDLC SERPL: 3 (ref 0–5)
HGB BLD-MCNC: 12.9 G/DL (ref 11.7–15.4)
IMM GRANULOCYTES # BLD AUTO: 0 K/UL (ref 0–0.5)
IMM GRANULOCYTES NFR BLD AUTO: 1 % (ref 0–5)
LDLC SERPL CALC-MCNC: 123 MG/DL (ref 0–100)
LYMPHOCYTES # BLD: 1.2 K/UL (ref 0.5–4.6)
LYMPHOCYTES NFR BLD: 26 % (ref 13–44)
MCH RBC QN AUTO: 29.5 PG (ref 26.1–32.9)
MCHC RBC AUTO-ENTMCNC: 31.9 G/DL (ref 31.4–35)
MCV RBC AUTO: 92.2 FL (ref 82–102)
MONOCYTES # BLD: 0.3 K/UL (ref 0.1–1.3)
MONOCYTES NFR BLD: 7 % (ref 4–12)
NEUTS SEG # BLD: 2.8 K/UL (ref 1.7–8.2)
NEUTS SEG NFR BLD: 61 % (ref 43–78)
NRBC # BLD: 0 K/UL (ref 0–0.2)
PLATELET # BLD AUTO: 195 K/UL (ref 150–450)
PLATELET COMMENT: ADEQUATE
PMV BLD AUTO: 10.6 FL (ref 9.4–12.3)
POTASSIUM SERPL-SCNC: 4.2 MMOL/L (ref 3.5–5.1)
PROT SERPL-MCNC: 7.2 G/DL (ref 6.3–8.2)
RBC # BLD AUTO: 4.38 M/UL (ref 4.05–5.2)
RBC MORPH BLD: NORMAL
SODIUM SERPL-SCNC: 139 MMOL/L (ref 136–145)
TRIGL SERPL-MCNC: 82 MG/DL (ref 0–150)
VLDLC SERPL CALC-MCNC: 16 MG/DL (ref 6–23)
WBC # BLD AUTO: 4.5 K/UL (ref 4.3–11.1)
WBC MORPH BLD: NORMAL

## 2024-08-27 PROCEDURE — 1123F ACP DISCUSS/DSCN MKR DOCD: CPT

## 2024-08-27 PROCEDURE — 99214 OFFICE O/P EST MOD 30 MIN: CPT

## 2024-08-27 PROCEDURE — 90677 PCV20 VACCINE IM: CPT

## 2024-08-27 PROCEDURE — G0009 ADMIN PNEUMOCOCCAL VACCINE: HCPCS

## 2024-08-27 PROCEDURE — G0439 PPPS, SUBSEQ VISIT: HCPCS

## 2024-08-27 PROCEDURE — 99497 ADVNCD CARE PLAN 30 MIN: CPT

## 2024-08-27 NOTE — PROGRESS NOTES
Aditi Dubose (: 1955) is a 69 y.o. female, established patient, here for evaluation of the following chief complaint(s):  Medicare AWV and Other (Discuss skin tag)       ASSESSMENT/PLAN:  1. Medicare annual wellness visit, subsequent  2. ACP (advance care planning)  -     ADVANCE CARE PLANNING FIRST 30 MINS  3. Elevated blood pressure reading without diagnosis of hypertension  4. Mixed hyperlipidemia  -     CBC with Auto Differential; Future  -     Comprehensive Metabolic Panel; Future  -     Lipid Panel; Future  5. Screening for diabetes mellitus  -     CBC with Auto Differential; Future  -     Comprehensive Metabolic Panel; Future  -     Hemoglobin A1C; Future  6. Need for prophylactic vaccination against Streptococcus pneumoniae (pneumococcus)  -     Pneumococcal, PCV20, PREVNAR 20, (age 18 yrs+), IM, PF      Return in about 6 months (around 2025) for chronic F/U.    Medicare wellness visit completed  Check labs today  See dentist every 6 months  See eye physician or get eyes checked every 1-2 years  Immunizations reviewed and discussed with patient    SUBJECTIVE/OBJECTIVE:  HPI    Here for medicare wellness today. Her last wellness check was 23.     Last mammo on 24: BI-RADS 1  Not due for colonoscopy. FIT test on 24 negative.     Needs labs today.    We discussed POA/living will documents.     No other concerns/complaints.     Vitals:    24 0935 24 1028   BP: (!) 150/70 (!) 146/88   Pulse: 82    Temp: 97.7 °F (36.5 °C)    SpO2: 99%    Weight: 66.8 kg (147 lb 6 oz)    Height: 1.537 m (5' 0.5\")       Discussed her BP- she will start monitoring at home. Feels it may be white coat related here in office. Denies headaches, visual changes, chest pains, palpitations. Goal less than 140/90.       Physical Exam  Constitutional:       Appearance: Normal appearance.   Cardiovascular:      Rate and Rhythm: Normal rate and regular rhythm.      Heart sounds: Normal heart

## 2024-08-28 DIAGNOSIS — E78.00 PURE HYPERCHOLESTEROLEMIA: Primary | ICD-10-CM

## 2024-08-28 RX ORDER — ROSUVASTATIN CALCIUM 5 MG/1
5 TABLET, COATED ORAL NIGHTLY
Qty: 90 TABLET | Refills: 1 | Status: SHIPPED | OUTPATIENT
Start: 2024-08-28

## 2024-08-28 NOTE — RESULT ENCOUNTER NOTE
Please call let patient know cholesterol has worsened. Her 10 year risk score for a heart attack or stroke is at 10.5%. we can consider a statin medication if she would like to start. If not, would highly recommend diet changes with saturated fat intake and routine exercise.       One of her liver enzymes remains elevated. Please ask patient if she is drinking any alcohol or having any right upper quadrant abdominal pain? May need to consider liver ultrasound if agreeable.    All other labs OK.
complains of pain/discomfort

## 2024-08-28 NOTE — PROGRESS NOTES
Discussed lab results with patient after MA called. She had further questions on her cholesterol levels and a possible liver ultrasound.     She is agreeable in trying rosuvastatin after further discussion. She does drink a glass of wine nightly for appx 5 nights weekly. Discussed alcohol intake could be contributing towards increased alk phos level. She would like to hold on a liver US at this time. Will repeat labwork at F/U appt.

## 2024-11-27 DIAGNOSIS — E78.00 PURE HYPERCHOLESTEROLEMIA: ICD-10-CM

## 2024-11-27 RX ORDER — ROSUVASTATIN CALCIUM 5 MG/1
5 TABLET, COATED ORAL NIGHTLY
Qty: 90 TABLET | Refills: 1 | OUTPATIENT
Start: 2024-11-27

## 2025-01-05 SDOH — HEALTH STABILITY: PHYSICAL HEALTH: ON AVERAGE, HOW MANY DAYS PER WEEK DO YOU ENGAGE IN MODERATE TO STRENUOUS EXERCISE (LIKE A BRISK WALK)?: 6 DAYS

## 2025-01-05 SDOH — HEALTH STABILITY: PHYSICAL HEALTH: ON AVERAGE, HOW MANY MINUTES DO YOU ENGAGE IN EXERCISE AT THIS LEVEL?: 60 MIN

## 2025-01-08 ENCOUNTER — OFFICE VISIT (OUTPATIENT)
Dept: INTERNAL MEDICINE CLINIC | Facility: CLINIC | Age: 70
End: 2025-01-08

## 2025-01-08 VITALS
WEIGHT: 153 LBS | TEMPERATURE: 97.7 F | HEIGHT: 61 IN | BODY MASS INDEX: 28.89 KG/M2 | DIASTOLIC BLOOD PRESSURE: 80 MMHG | OXYGEN SATURATION: 97 % | HEART RATE: 84 BPM | SYSTOLIC BLOOD PRESSURE: 128 MMHG

## 2025-01-08 DIAGNOSIS — E78.2 MIXED HYPERLIPIDEMIA: ICD-10-CM

## 2025-01-08 DIAGNOSIS — R73.01 IFG (IMPAIRED FASTING GLUCOSE): ICD-10-CM

## 2025-01-08 DIAGNOSIS — R06.00 DYSPNEA, UNSPECIFIED TYPE: ICD-10-CM

## 2025-01-08 DIAGNOSIS — N95.1 MENOPAUSAL STATE: ICD-10-CM

## 2025-01-08 DIAGNOSIS — R74.8 ALKALINE PHOSPHATASE ELEVATION: ICD-10-CM

## 2025-01-08 DIAGNOSIS — Z12.11 SCREENING FOR COLON CANCER: Primary | ICD-10-CM

## 2025-01-08 DIAGNOSIS — E78.00 PURE HYPERCHOLESTEROLEMIA: ICD-10-CM

## 2025-01-08 DIAGNOSIS — R63.5 WEIGHT GAIN: ICD-10-CM

## 2025-01-08 LAB
ALBUMIN SERPL-MCNC: 4.2 G/DL (ref 3.2–4.6)
ALBUMIN/GLOB SERPL: 1.3 (ref 1–1.9)
ALP SERPL-CCNC: 145 U/L (ref 35–104)
ALT SERPL-CCNC: 35 U/L (ref 8–45)
ANION GAP SERPL CALC-SCNC: 12 MMOL/L (ref 7–16)
AST SERPL-CCNC: 24 U/L (ref 15–37)
BILIRUB SERPL-MCNC: 0.4 MG/DL (ref 0–1.2)
BUN SERPL-MCNC: 12 MG/DL (ref 8–23)
CALCIUM SERPL-MCNC: 9.3 MG/DL (ref 8.8–10.2)
CHLORIDE SERPL-SCNC: 104 MMOL/L (ref 98–107)
CHOLEST SERPL-MCNC: 156 MG/DL (ref 0–200)
CO2 SERPL-SCNC: 25 MMOL/L (ref 20–29)
CREAT SERPL-MCNC: 0.64 MG/DL (ref 0.6–1.1)
D DIMER PPP FEU-MCNC: 1.77 UG/ML(FEU)
ERYTHROCYTE [DISTWIDTH] IN BLOOD BY AUTOMATED COUNT: 13.5 % (ref 11.9–14.6)
EST. AVERAGE GLUCOSE BLD GHB EST-MCNC: 114 MG/DL
GLOBULIN SER CALC-MCNC: 3.2 G/DL (ref 2.3–3.5)
GLUCOSE SERPL-MCNC: 92 MG/DL (ref 70–99)
HBA1C MFR BLD: 5.6 % (ref 0–5.6)
HCT VFR BLD AUTO: 41.8 % (ref 35.8–46.3)
HDLC SERPL-MCNC: 85 MG/DL (ref 40–60)
HDLC SERPL: 1.8 (ref 0–5)
HGB BLD-MCNC: 13.2 G/DL (ref 11.7–15.4)
LDLC SERPL CALC-MCNC: 58 MG/DL (ref 0–100)
MCH RBC QN AUTO: 28.9 PG (ref 26.1–32.9)
MCHC RBC AUTO-ENTMCNC: 31.6 G/DL (ref 31.4–35)
MCV RBC AUTO: 91.5 FL (ref 82–102)
NRBC # BLD: 0 K/UL (ref 0–0.2)
PLATELET # BLD AUTO: 137 K/UL (ref 150–450)
PMV BLD AUTO: 11.5 FL (ref 9.4–12.3)
POTASSIUM SERPL-SCNC: 4.3 MMOL/L (ref 3.5–5.1)
PROT SERPL-MCNC: 7.4 G/DL (ref 6.3–8.2)
RBC # BLD AUTO: 4.57 M/UL (ref 4.05–5.2)
SODIUM SERPL-SCNC: 140 MMOL/L (ref 136–145)
T4 FREE SERPL-MCNC: 1.2 NG/DL (ref 0.9–1.7)
TRIGL SERPL-MCNC: 64 MG/DL (ref 0–150)
TSH, 3RD GENERATION: 1.58 UIU/ML (ref 0.27–4.2)
VLDLC SERPL CALC-MCNC: 13 MG/DL (ref 6–23)
WBC # BLD AUTO: 6 K/UL (ref 4.3–11.1)

## 2025-01-08 RX ORDER — ROSUVASTATIN CALCIUM 5 MG/1
5 TABLET, COATED ORAL NIGHTLY
Qty: 90 TABLET | Refills: 3 | Status: SHIPPED | OUTPATIENT
Start: 2025-01-08

## 2025-01-08 SDOH — ECONOMIC STABILITY: FOOD INSECURITY: WITHIN THE PAST 12 MONTHS, YOU WORRIED THAT YOUR FOOD WOULD RUN OUT BEFORE YOU GOT MONEY TO BUY MORE.: NEVER TRUE

## 2025-01-08 SDOH — ECONOMIC STABILITY: FOOD INSECURITY: WITHIN THE PAST 12 MONTHS, THE FOOD YOU BOUGHT JUST DIDN'T LAST AND YOU DIDN'T HAVE MONEY TO GET MORE.: NEVER TRUE

## 2025-01-08 ASSESSMENT — PATIENT HEALTH QUESTIONNAIRE - PHQ9
1. LITTLE INTEREST OR PLEASURE IN DOING THINGS: NOT AT ALL
SUM OF ALL RESPONSES TO PHQ QUESTIONS 1-9: 0
SUM OF ALL RESPONSES TO PHQ9 QUESTIONS 1 & 2: 0
2. FEELING DOWN, DEPRESSED OR HOPELESS: NOT AT ALL
SUM OF ALL RESPONSES TO PHQ QUESTIONS 1-9: 0

## 2025-01-08 ASSESSMENT — LIFESTYLE VARIABLES
HOW OFTEN DO YOU HAVE A DRINK CONTAINING ALCOHOL: 4 OR MORE TIMES A WEEK
HOW OFTEN DO YOU HAVE A DRINK CONTAINING ALCOHOL: 4 OR MORE TIMES A WEEK
HOW MANY STANDARD DRINKS CONTAINING ALCOHOL DO YOU HAVE ON A TYPICAL DAY: 1 OR 2
HOW MANY STANDARD DRINKS CONTAINING ALCOHOL DO YOU HAVE ON A TYPICAL DAY: 1 OR 2

## 2025-01-08 NOTE — PROGRESS NOTES
Aditi Dubose (: 1955) is a 69 y.o. female, here for evaluation of the following chief complaint(s):  Established New Doctor (Changing PCP) and Other (Problem with weight loss)     Assessment & Plan  1. Weight management.  Her BMI is currently 29.3, which does not qualify her for Adipex treatment. Her A1c level is 5.6, indicating that she is not prediabetic. She was advised to reduce her caloric intake and continue her weightlifting regimen. The potential benefits and side effects of weight loss medications were discussed, but she prefers to continue with her current exercise and diet regimen. A prescription for her cholesterol medication was provided.    2. Elevated alkaline phosphatase.  Her alkaline phosphatase levels have been slightly elevated, with readings of 143 in  and 141 in 2024. A recheck of her alkaline phosphatase levels will be conducted to determine if the elevation is due to bone or liver origin. Further tests will be ordered based on these results.    3. Shortness of breath.  She experiences shortness of breath when climbing stairs and during initial uphill walks. She has a history of smoking but quit over 15 years ago. An EKG will be performed today. A stress test will be ordered to rule out cardiac causes. If the stress test is normal, a pulmonary function test may be considered to evaluate for COPD or other lung issues.    4. Health maintenance.  A FIT test was provided for her to complete and return. A bone density test will be scheduled. Blood work will be ordered to assess her thyroid function, including TSH and free T4 levels.    Follow-up  The patient will follow up in 6 months.    PROCEDURE  The patient had a colonoscopy in the past.  1. Screening for colon cancer  -     POCT FECAL IMMUNOCHEMICAL TEST (FIT) (); Future  2. Pure hypercholesterolemia  -     rosuvastatin (CRESTOR) 5 MG tablet; Take 1 tablet by mouth at bedtime, Disp-90 tablet, R-3Normal  -

## 2025-01-09 LAB — ALP SERPL-CCNC: 149 IU/L (ref 44–121)

## 2025-01-10 ENCOUNTER — TELEPHONE (OUTPATIENT)
Dept: INTERNAL MEDICINE CLINIC | Facility: CLINIC | Age: 70
End: 2025-01-10

## 2025-01-10 LAB — 5NT SERPL-CCNC: 3 IU/L (ref 0–11)

## 2025-01-10 NOTE — TELEPHONE ENCOUNTER
Calling back patient needs some kind of medication so she can have her CT done    Please call the patient back and advise

## 2025-01-10 NOTE — TELEPHONE ENCOUNTER
The patient states she thinks she had a reaction to the contrast before needs pre-medication. I talked with Dr Smith he said he didn't order it stat.

## 2025-01-14 ENCOUNTER — PATIENT MESSAGE (OUTPATIENT)
Dept: INTERNAL MEDICINE CLINIC | Facility: CLINIC | Age: 70
End: 2025-01-14

## 2025-01-15 ENCOUNTER — TELEPHONE (OUTPATIENT)
Dept: INTERNAL MEDICINE CLINIC | Facility: CLINIC | Age: 70
End: 2025-01-15

## 2025-01-15 RX ORDER — PREDNISONE 50 MG/1
TABLET ORAL
Qty: 3 TABLET | Refills: 0 | Status: SHIPPED | OUTPATIENT
Start: 2025-01-15

## 2025-01-15 RX ORDER — DIPHENHYDRAMINE HCL 25 MG
TABLET ORAL
Qty: 2 TABLET | Refills: 0 | Status: SHIPPED | OUTPATIENT
Start: 2025-01-15

## 2025-01-15 NOTE — TELEPHONE ENCOUNTER
Patient called and states that she sent a My Chart Message yesterday in regards to if she is needing medicine before her CT scan. Patient would like to know if someone could call her back in regards to her medicine before she has her CT scan done tomorrow. Please Advise.

## 2025-01-16 ENCOUNTER — HOSPITAL ENCOUNTER (OUTPATIENT)
Dept: CT IMAGING | Age: 70
Discharge: HOME | End: 2025-01-19
Attending: INTERNAL MEDICINE
Payer: MEDICARE

## 2025-01-16 ENCOUNTER — HOSPITAL ENCOUNTER (OUTPATIENT)
Dept: MAMMOGRAPHY | Age: 70
Discharge: HOME | End: 2025-01-19
Attending: INTERNAL MEDICINE
Payer: MEDICARE

## 2025-01-16 DIAGNOSIS — N95.1 MENOPAUSAL STATE: ICD-10-CM

## 2025-01-16 DIAGNOSIS — R06.00 DYSPNEA, UNSPECIFIED TYPE: ICD-10-CM

## 2025-01-16 PROCEDURE — 6360000004 HC RX CONTRAST MEDICATION: Performed by: INTERNAL MEDICINE

## 2025-01-16 PROCEDURE — 77080 DXA BONE DENSITY AXIAL: CPT

## 2025-01-16 PROCEDURE — 71260 CT THORAX DX C+: CPT

## 2025-01-16 RX ORDER — IOPAMIDOL 755 MG/ML
100 INJECTION, SOLUTION INTRAVASCULAR
Status: DISCONTINUED | OUTPATIENT
Start: 2025-01-16 | End: 2025-01-20 | Stop reason: HOSPADM

## 2025-01-16 RX ORDER — IOPAMIDOL 755 MG/ML
100 INJECTION, SOLUTION INTRAVASCULAR
Status: COMPLETED | OUTPATIENT
Start: 2025-01-16 | End: 2025-01-16

## 2025-01-16 RX ADMIN — IOPAMIDOL 100 ML: 755 INJECTION, SOLUTION INTRAVENOUS at 14:56

## 2025-01-20 LAB
ALP BONE CFR SERPL: 41 % (ref 14–68)
ALP INTEST CFR SERPL: 6 % (ref 0–18)
ALP LIVER CFR SERPL: 53 % (ref 18–85)
ALP SERPL-CCNC: 149 IU/L (ref 44–121)

## 2025-01-21 DIAGNOSIS — R93.7 ABNORMAL BONE DENSITY SCREENING: Primary | ICD-10-CM

## 2025-01-21 DIAGNOSIS — M81.0 AGE-RELATED OSTEOPOROSIS WITHOUT CURRENT PATHOLOGICAL FRACTURE: ICD-10-CM

## 2025-01-23 ENCOUNTER — INITIAL CONSULT (OUTPATIENT)
Age: 70
End: 2025-01-23
Payer: MEDICARE

## 2025-01-23 VITALS
BODY MASS INDEX: 29.27 KG/M2 | DIASTOLIC BLOOD PRESSURE: 74 MMHG | WEIGHT: 155 LBS | SYSTOLIC BLOOD PRESSURE: 136 MMHG | HEIGHT: 61 IN | HEART RATE: 77 BPM

## 2025-01-23 DIAGNOSIS — R06.09 DOE (DYSPNEA ON EXERTION): Primary | ICD-10-CM

## 2025-01-23 PROCEDURE — 1159F MED LIST DOCD IN RCRD: CPT | Performed by: INTERNAL MEDICINE

## 2025-01-23 PROCEDURE — 1126F AMNT PAIN NOTED NONE PRSNT: CPT | Performed by: INTERNAL MEDICINE

## 2025-01-23 PROCEDURE — 1160F RVW MEDS BY RX/DR IN RCRD: CPT | Performed by: INTERNAL MEDICINE

## 2025-01-23 PROCEDURE — 1123F ACP DISCUSS/DSCN MKR DOCD: CPT | Performed by: INTERNAL MEDICINE

## 2025-01-23 PROCEDURE — 99204 OFFICE O/P NEW MOD 45 MIN: CPT | Performed by: INTERNAL MEDICINE

## 2025-01-23 NOTE — PROGRESS NOTES
[address] [facility], [place] [address]  PHONE: [phone]    SUBJECTIVE:   Aditi Dubose is a 69 y.o. female 1955   seen for a consultation visit regarding the following:     Chief Complaint   Patient presents with    Shortness of Breath    Consultation              Consultation is requested for evaluation of Shortness of Breath and Consultation   .    History of present illness: 69 y.o. female with PMH HLD presenting for evaluation of MONAE. She feels short of breath going up the stairs or hiking. She is still able to walk on the treadmill 5 miles daily without any issues. The shortness of breath has been going on for about 5 years or so. No worsening recently. No chest pain.      Past Medical History, Past Surgical History, Family history, Social History, and Medications were all reviewed with the patient today and updated as necessary.       Allergies   Allergen Reactions    Seasonal      Past Medical History:   Diagnosis Date    Former cigarette smoker     Inguinal hernia     left     Past Surgical History:   Procedure Laterality Date    CHOLECYSTECTOMY      COLONOSCOPY      HERNIA REPAIR Left 2023    HERNIA INGUINAL REPAIR LAPAROSCOPIC ROBOTIC LEFT performed by Jennifer Ordaz DO at [facility] MAIN OR     Family History   Problem Relation Age of Onset    Diabetes Mother     Osteoporosis Mother     Lung Cancer Father     Cancer Father     Breast Cancer Neg Hx      Social History     Tobacco Use    Smoking status: Former     Current packs/day: 0.00     Average packs/day: 1 pack/day for 40.0 years (40.0 ttl pk-yrs)     Types: Cigarettes     Start date: 1966     Quit date: 2006     Years since quittin.5    Smokeless tobacco: Never   Substance Use Topics    Alcohol use: Yes     Alcohol/week: 5.0 standard drinks of alcohol     Types: 3 Glasses of wine, 2 Drinks containing 0.5 oz of alcohol per week     Comment: On average - not every week       ROS:    Review of

## 2025-02-20 ENCOUNTER — TELEPHONE (OUTPATIENT)
Age: 70
End: 2025-02-20

## 2025-02-20 NOTE — TELEPHONE ENCOUNTER
This nurse spoke with Ms. Dubose regarding echo results. Reassured Ms. Dubose that she would have received a call for concerning results, apologized for her frustration.  Questions answered, no further needs at this time.

## 2025-02-20 NOTE — TELEPHONE ENCOUNTER
Pt called in stating she had an echo done on 2/13 and has not gotten a call regarding the results she sees the results in [facility],so pt would like a call back regarding echo results,pt is upset that she has not gotten a response from the doctor regarding the results even if they are not bad and pt is not pleased this is her first visit

## 2025-02-27 ENCOUNTER — TELEPHONE (OUTPATIENT)
Age: 70
End: 2025-02-27

## 2025-02-27 NOTE — TELEPHONE ENCOUNTER
Spoke with pt and informed of physician's response regarding echo results.Pt voiced understanding.

## 2025-02-27 NOTE — TELEPHONE ENCOUNTER
Spoke with pt regarding echo results.    Pt wants a more detailed explanation about the mild to moderate leakiness of the mitral valve . Please advise.

## (undated) DEVICE — MASTISOL ADHESIVE LIQ 2/3ML

## (undated) DEVICE — PAD PT POS 36 IN SURGYPAD DISP

## (undated) DEVICE — TIP COVER ACCESSORY

## (undated) DEVICE — COLUMN DRAPE

## (undated) DEVICE — ARM DRAPE

## (undated) DEVICE — STRIP,CLOSURE,WOUND,MEDI-STRIP,1/2X4: Brand: MEDLINE

## (undated) DEVICE — COVER,TABLE,44X76,STERILE: Brand: MEDLINE

## (undated) DEVICE — SUTURE VCRL SZ 2-0 L27IN ABSRB UD L26MM SH 1/2 CIR J417H

## (undated) DEVICE — SUTURE V-LOC 180 SZ 3-0 L9IN ABSRB GRN L26MM V-20 1/2 CIR VLOCL0644

## (undated) DEVICE — [HIGH FLOW INSUFFLATOR,  DO NOT USE IF PACKAGE IS DAMAGED,  KEEP DRY,  KEEP AWAY FROM SUNLIGHT,  PROTECT FROM HEAT AND RADIOACTIVE SOURCES.]: Brand: PNEUMOSURE

## (undated) DEVICE — TOTAL TRAY, DB, 100% SILI FOLEY, 16FR 10: Brand: MEDLINE

## (undated) DEVICE — BLADELESS OBTURATOR: Brand: WECK VISTA

## (undated) DEVICE — TROCAR: Brand: KII FIOS FIRST ENTRY

## (undated) DEVICE — GLOVE ORANGE PI 7   MSG9070

## (undated) DEVICE — SOLUTION ANTIFOG VIS SYS CLEARIFY LAPSCP

## (undated) DEVICE — SUTURE MCRYL SZ 3-0 L27IN ABSRB UD L19MM PS-2 3/8 CIR PRIM Y427H

## (undated) DEVICE — GENERAL LAPAROSCOPY: Brand: MEDLINE INDUSTRIES, INC.

## (undated) DEVICE — ELECTRO LUBE IS A SINGLE PATIENT USE DEVICE THAT IS INTENDED TO BE USED ON ELECTROSURGICAL ELECTRODES TO REDUCE STICKING.: Brand: KEY SURGICAL ELECTRO LUBE

## (undated) DEVICE — GLOVE SURG SZ 7 L12IN FNGR THK79MIL GRN LTX FREE